# Patient Record
Sex: MALE | Race: WHITE | NOT HISPANIC OR LATINO | Employment: OTHER | URBAN - METROPOLITAN AREA
[De-identification: names, ages, dates, MRNs, and addresses within clinical notes are randomized per-mention and may not be internally consistent; named-entity substitution may affect disease eponyms.]

---

## 2021-08-17 RX ORDER — SODIUM, POTASSIUM,MAG SULFATES 17.5-3.13G
SOLUTION, RECONSTITUTED, ORAL ORAL
Qty: 1 ML | Refills: 0 | OUTPATIENT
Start: 2021-08-17 | End: 2023-03-06

## 2021-08-31 ENCOUNTER — OUTSIDE FACILITY SERVICE (OUTPATIENT)
Dept: GASTROENTEROLOGY | Facility: CLINIC | Age: 51
End: 2021-08-31

## 2021-08-31 PROCEDURE — 45378 DIAGNOSTIC COLONOSCOPY: CPT | Performed by: INTERNAL MEDICINE

## 2022-08-16 ENCOUNTER — OFFICE VISIT (OUTPATIENT)
Dept: ENDOCRINOLOGY | Facility: CLINIC | Age: 52
End: 2022-08-16

## 2022-08-16 VITALS
BODY MASS INDEX: 22.33 KG/M2 | HEIGHT: 70 IN | OXYGEN SATURATION: 98 % | HEART RATE: 76 BPM | DIASTOLIC BLOOD PRESSURE: 78 MMHG | SYSTOLIC BLOOD PRESSURE: 124 MMHG | WEIGHT: 156 LBS

## 2022-08-16 DIAGNOSIS — Z79.4 INSULIN LONG-TERM USE: ICD-10-CM

## 2022-08-16 DIAGNOSIS — E03.9 HYPOTHYROIDISM (ACQUIRED): ICD-10-CM

## 2022-08-16 DIAGNOSIS — Z79.4 TYPE 2 DIABETES MELLITUS WITH HYPERGLYCEMIA, WITH LONG-TERM CURRENT USE OF INSULIN: Primary | ICD-10-CM

## 2022-08-16 DIAGNOSIS — E78.2 MIXED HYPERLIPIDEMIA: ICD-10-CM

## 2022-08-16 DIAGNOSIS — E11.65 TYPE 2 DIABETES MELLITUS WITH HYPERGLYCEMIA, WITH LONG-TERM CURRENT USE OF INSULIN: Primary | ICD-10-CM

## 2022-08-16 LAB
EXPIRATION DATE: ABNORMAL
GLUCOSE BLDC GLUCOMTR-MCNC: 191 MG/DL (ref 70–130)
Lab: ABNORMAL

## 2022-08-16 PROCEDURE — 82947 ASSAY GLUCOSE BLOOD QUANT: CPT | Performed by: INTERNAL MEDICINE

## 2022-08-16 PROCEDURE — 99204 OFFICE O/P NEW MOD 45 MIN: CPT | Performed by: INTERNAL MEDICINE

## 2022-08-16 RX ORDER — SIMVASTATIN 20 MG
TABLET ORAL
COMMUNITY
Start: 2022-08-10

## 2022-08-16 RX ORDER — EMPAGLIFLOZIN 10 MG/1
TABLET, FILM COATED ORAL
COMMUNITY
Start: 2022-06-24 | End: 2022-08-16 | Stop reason: DRUGHIGH

## 2022-08-16 RX ORDER — BLOOD-GLUCOSE METER
1 EACH MISCELLANEOUS 2 TIMES DAILY
Qty: 1 KIT | Refills: 0 | Status: SHIPPED | OUTPATIENT
Start: 2022-08-16

## 2022-08-16 RX ORDER — METFORMIN HYDROCHLORIDE 500 MG/1
TABLET, EXTENDED RELEASE ORAL
COMMUNITY
Start: 2022-08-07

## 2022-08-16 RX ORDER — LANCETS
EACH MISCELLANEOUS
Qty: 200 EACH | Refills: 3 | Status: SHIPPED | OUTPATIENT
Start: 2022-08-16

## 2022-08-16 RX ORDER — LEVOTHYROXINE SODIUM 0.05 MG/1
TABLET ORAL
COMMUNITY
Start: 2022-06-24 | End: 2022-08-17 | Stop reason: DRUGHIGH

## 2022-08-16 RX ORDER — INSULIN GLARGINE 100 [IU]/ML
INJECTION, SOLUTION SUBCUTANEOUS
COMMUNITY
Start: 2022-07-29

## 2022-08-16 RX ORDER — BLOOD SUGAR DIAGNOSTIC
STRIP MISCELLANEOUS
Qty: 200 EACH | Refills: 3 | Status: SHIPPED | OUTPATIENT
Start: 2022-08-16

## 2022-08-16 RX ORDER — PEN NEEDLE, DIABETIC 31 G X1/4"
NEEDLE, DISPOSABLE MISCELLANEOUS
COMMUNITY
Start: 2022-06-16

## 2022-08-16 NOTE — ASSESSMENT & PLAN NOTE
Diabetes is worsening.   We discussed treatment options today.  Increase jardiance.  Try adding januvia.  Potential s/e discussed.  Diabetes will be reassessed in 3 months.    Sent in Rx for new glucometer today.

## 2022-08-16 NOTE — PROGRESS NOTES
"     Office Note      Date: 2022  Patient Name: Keny Rosen  MRN: [id number]  : 1970    Chief Complaint   Patient presents with   • Hypothyroidism   • Diabetes       History of Present Illness:   Keny Rosen is a 52 y.o. male who presents for Diabetes type 2. Diagnosed in: . Treated in past with oral agents. Current treatments: metformin, jardiance and basal insulin. Number of insulin shots per day: 1. Checks blood sugar 3 times a week. Has low blood sugar: rare. Aspirin use: No -  . Statin use: Yes. ACE-I/ARB use: No -  . Last eye exam: 2021.    Last A1c was 9.6%.  He has had DM education in the past.     He has h/o hypothyroidism.  He is on T4 50mcg qd.  Last TSH was 11 about 2 months ago.  He says he wasn't taking the T4 regularly at this time.  He is taking the T4 correctly.  He isn't taking any interfering meds concurrently.      Subjective      Diabetic Complications:  Eyes: No  Kidneys: No  Feet: No  Heart: No    Diet and Exercise:  Meals per day: >4  Minutes of exercise per week: 0 mins.    Review of Systems:   Review of Systems   Constitutional: Negative.    HENT: Positive for hearing loss.    Eyes: Negative.    Respiratory: Negative.    Cardiovascular: Negative.    Gastrointestinal: Negative.    Endocrine: Negative.    Genitourinary: Negative.    Musculoskeletal: Negative.    Skin: Negative.    Allergic/Immunologic: Negative.    Neurological: Negative.    Hematological: Negative.    Psychiatric/Behavioral: Negative.        The following portions of the patient's history were reviewed and updated as appropriate: allergies, current medications, past family history, past medical history, past social history, past surgical history and problem list.    Objective     Visit Vitals  /78   Pulse 76   Ht 177.8 cm (70\")   Wt 70.8 kg (156 lb)   SpO2 98%   BMI 22.38 kg/m²       Physical Exam:  Physical Exam  Constitutional:       Appearance: Normal appearance.   HENT:      Head: " Normocephalic and atraumatic.   Eyes:      Extraocular Movements: Extraocular movements intact.      Conjunctiva/sclera: Conjunctivae normal.      Pupils: Pupils are equal, round, and reactive to light.   Cardiovascular:      Rate and Rhythm: Normal rate and regular rhythm.      Pulses: Normal pulses.      Heart sounds: Normal heart sounds.   Pulmonary:      Effort: Pulmonary effort is normal.      Breath sounds: Normal breath sounds.   Abdominal:      General: Bowel sounds are normal.      Palpations: Abdomen is soft.   Musculoskeletal:         General: Normal range of motion.      Cervical back: Normal range of motion and neck supple.   Skin:     General: Skin is warm and dry.   Neurological:      General: No focal deficit present.      Mental Status: He is alert.   Psychiatric:         Mood and Affect: Mood normal.         Behavior: Behavior normal.         Thought Content: Thought content normal.         Judgment: Judgment normal.         Labs:    HbA1c  No results found for: HGBA1C.    CMP  No results found for: GLUCOSE, BUN, CREATININE, EGFRIFNONA, EGFRIFAFRI, BCR, K, CO2, CALCIUM, PROTENTOTREF, LABIL2, BILIRUBIN, AST, ALT     Lipid Panel        TSH  No results found for: TSH, FREET4     Hemoglobin A1C        Microalbumin/Creatinine  No results found for: MALBCRERATI        Assessment / Plan      Assessment & Plan:  Diagnoses and all orders for this visit:    1. Type 2 diabetes mellitus with hyperglycemia, with long-term current use of insulin (HCC) (Primary)  Assessment & Plan:  Diabetes is worsening.   We discussed treatment options today.  Increase jardiance.  Try adding januvia.  Potential s/e discussed.  Diabetes will be reassessed in 3 months.    Sent in Rx for new glucometer today.    Orders:  -     POC Glucose, Blood    2. Hypothyroidism (acquired)  Assessment & Plan:  Continue T4.  Check TSH today.  Will send note about results.    Orders:  -     TSH; Future    3. Mixed hyperlipidemia  Assessment &  Plan:  Continue statin.  Plan to check lipids next visit.      4. Insulin long-term use (HCC)    Other orders  -     empagliflozin (JARDIANCE) 25 MG tablet tablet; Take 1 tablet by mouth Daily.  Dispense: 90 tablet; Refill: 3  -     Accu-Chek FastClix Lancets misc; Use BID; ICD-10 E11.65; Z79.4  Dispense: 200 each; Refill: 3  -     SITagliptin (Januvia) 100 MG tablet; Take 1 tablet by mouth Daily.  Dispense: 90 tablet; Refill: 3  -     Blood Glucose Monitoring Suppl (Accu-Chek Guide Me) w/Device kit; 1 kit 2 (Two) Times a Day.  Dispense: 1 kit; Refill: 0  -     Accu-Chek Guide test strip; Use BID; ICD-10 E11.65; Z79.4  Dispense: 200 each; Refill: 3       Return in about 3 months (around 11/16/2022) for Recheck with A1c, CMP, lipids, TSH, microalbumin.    Carlos Eduardo Chatman MD   08/16/2022

## 2022-08-17 LAB — TSH SERPL DL<=0.005 MIU/L-ACNC: 4.32 UIU/ML (ref 0.27–4.2)

## 2022-08-17 RX ORDER — LEVOTHYROXINE SODIUM 0.07 MG/1
75 TABLET ORAL DAILY
Qty: 90 TABLET | Refills: 3 | OUTPATIENT
Start: 2022-08-17 | End: 2023-03-06

## 2022-11-23 ENCOUNTER — OFFICE VISIT (OUTPATIENT)
Dept: ENDOCRINOLOGY | Facility: CLINIC | Age: 52
End: 2022-11-23

## 2022-11-23 VITALS
DIASTOLIC BLOOD PRESSURE: 70 MMHG | BODY MASS INDEX: 22.62 KG/M2 | OXYGEN SATURATION: 98 % | SYSTOLIC BLOOD PRESSURE: 120 MMHG | HEIGHT: 70 IN | HEART RATE: 72 BPM | WEIGHT: 158 LBS

## 2022-11-23 DIAGNOSIS — Z79.4 TYPE 2 DIABETES MELLITUS WITH HYPERGLYCEMIA, WITH LONG-TERM CURRENT USE OF INSULIN: Primary | ICD-10-CM

## 2022-11-23 DIAGNOSIS — E03.9 HYPOTHYROIDISM (ACQUIRED): ICD-10-CM

## 2022-11-23 DIAGNOSIS — E11.649 TYPE 2 DIABETES MELLITUS WITH HYPOGLYCEMIA WITHOUT COMA, WITH LONG-TERM CURRENT USE OF INSULIN: ICD-10-CM

## 2022-11-23 DIAGNOSIS — Z79.4 TYPE 2 DIABETES MELLITUS WITH HYPOGLYCEMIA WITHOUT COMA, WITH LONG-TERM CURRENT USE OF INSULIN: ICD-10-CM

## 2022-11-23 DIAGNOSIS — E11.65 TYPE 2 DIABETES MELLITUS WITH HYPERGLYCEMIA, WITH LONG-TERM CURRENT USE OF INSULIN: Primary | ICD-10-CM

## 2022-11-23 DIAGNOSIS — Z79.4 INSULIN LONG-TERM USE: ICD-10-CM

## 2022-11-23 DIAGNOSIS — E78.2 MIXED HYPERLIPIDEMIA: ICD-10-CM

## 2022-11-23 LAB
EXPIRATION DATE: ABNORMAL
EXPIRATION DATE: NORMAL
GLUCOSE BLDC GLUCOMTR-MCNC: 270 MG/DL (ref 70–130)
HBA1C MFR BLD: 7.9 %
Lab: ABNORMAL
Lab: NORMAL

## 2022-11-23 PROCEDURE — 83036 HEMOGLOBIN GLYCOSYLATED A1C: CPT | Performed by: INTERNAL MEDICINE

## 2022-11-23 PROCEDURE — 99214 OFFICE O/P EST MOD 30 MIN: CPT | Performed by: INTERNAL MEDICINE

## 2022-11-23 PROCEDURE — 82947 ASSAY GLUCOSE BLOOD QUANT: CPT | Performed by: INTERNAL MEDICINE

## 2022-11-23 NOTE — PROGRESS NOTES
"     Office Note      Date: 2022  Patient Name: Keny Rosen  MRN: [id number]  : 1970    Chief Complaint   Patient presents with   • Diabetes     Type II   • Hypothyroidism       History of Present Illness:   Keny Rosen is a 52 y.o. male who presents for Diabetes type 2. Diagnosed in: . Treated in past with oral agents. Current treatments: januvia, metformin, jardiance and basal insulin. Number of insulin shots per day: 1. Checks blood sugar 3 times a week. Has low blood sugar: rare. Aspirin use: No -  . Statin use: Yes. ACE-I/ARB use: No -  . Change in health since last visit: none. Last eye exam: 2021.      He has h/o hypothyroidism.  He is on T4 75mcg qd.  He is taking the T4 correctly.  He isn't taking any interfering meds concurrently.  He denies any sxs of hypo- or hyperthyroidism.  He hasn't noted any change in the size of his neck.  He denies any compressive sxs.      Subjective      Diabetic Complications:  Eyes: No  Kidneys: No  Feet: No  Heart: No    Diet and Exercise:  Meals per day: >4  Minutes of exercise per week: 0 mins.    Review of Systems:   Review of Systems   Constitutional: Negative.    Cardiovascular: Negative.    Gastrointestinal: Negative.    Endocrine: Negative.        The following portions of the patient's history were reviewed and updated as appropriate: allergies, current medications, past family history, past medical history, past social history, past surgical history and problem list.    Objective       Visit Vitals  /70 (BP Location: Left arm, Patient Position: Sitting, Cuff Size: Adult)   Pulse 72   Ht 177.8 cm (70\")   Wt 71.7 kg (158 lb)   SpO2 98%   BMI 22.67 kg/m²       Physical Exam:  Physical Exam  Constitutional:       Appearance: Normal appearance.   Neurological:      Mental Status: He is alert.         Labs:    HbA1c  Lab Results   Component Value Date    HGBA1C 7.9 2022       CMP  No results found for: GLUCOSE, BUN, CREATININE, " EGFRIFNONA, EGFRIFAFRI, BCR, K, CO2, CALCIUM, PROTENTOTREF, LABIL2, BILIRUBIN, AST, ALT     Lipid Panel        TSH  Lab Results   Component Value Date    TSH 4.320 (H) 08/16/2022        Hemoglobin A1C  Lab Results   Component Value Date    HGBA1C 7.9 11/23/2022        Microalbumin/Creatinine  No results found for: MALBCRERATIO, CREATINIURIN, MICROALBUR        Assessment / Plan      Assessment & Plan:  Diagnoses and all orders for this visit:    1. Type 2 diabetes mellitus with hyperglycemia, with long-term current use of insulin (HCC) (Primary)  Assessment & Plan:  Diabetes is improving with treatment.  A1c much better but having some fasting hypoglycemia.  Continue current treatment regimen.  Decrease basal insulin.  Work on diet/exercise.  Diabetes will be reassessed in 3 months.    Orders:  -     POC Glucose, Blood  -     POC Glycosylated Hemoglobin (Hb A1C)  -     Comprehensive Metabolic Panel; Future  -     Lipid Panel; Future  -     Microalbumin / Creatinine Urine Ratio - Urine, Clean Catch; Future  -     TSH; Future    2. Mixed hyperlipidemia  Assessment & Plan:  Continue statin.  Check lipids today.      3. Hypothyroidism (acquired)  Assessment & Plan:  Continue T4 tx.  Check TSH.      4. Insulin long-term use (HCC)    5. Type 2 diabetes mellitus with hypoglycemia without coma, with long-term current use of insulin (HCC)  Assessment & Plan:  Some fasting hypoglycemia.  Decrease basal insulin.        Return in about 3 months (around 2/23/2023) for Recheck with A1c, TSH.    Carlos Eduardo Chatman MD   11/23/2022

## 2022-11-23 NOTE — ASSESSMENT & PLAN NOTE
Diabetes is improving with treatment.  A1c much better but having some fasting hypoglycemia.  Continue current treatment regimen.  Decrease basal insulin.  Work on diet/exercise.  Diabetes will be reassessed in 3 months.

## 2022-11-24 LAB
ALBUMIN SERPL-MCNC: 4.6 G/DL (ref 3.5–5.2)
ALBUMIN/CREAT UR: 34 MG/G CREAT (ref 0–29)
ALBUMIN/GLOB SERPL: 2.7 G/DL
ALP SERPL-CCNC: 88 U/L (ref 39–117)
ALT SERPL-CCNC: 32 U/L (ref 1–41)
AST SERPL-CCNC: 31 U/L (ref 1–40)
BILIRUB SERPL-MCNC: 0.5 MG/DL (ref 0–1.2)
BUN SERPL-MCNC: 19 MG/DL (ref 6–20)
BUN/CREAT SERPL: 22.4 (ref 7–25)
CALCIUM SERPL-MCNC: 10.2 MG/DL (ref 8.6–10.5)
CHLORIDE SERPL-SCNC: 103 MMOL/L (ref 98–107)
CHOLEST SERPL-MCNC: 130 MG/DL (ref 0–200)
CO2 SERPL-SCNC: 24.4 MMOL/L (ref 22–29)
CREAT SERPL-MCNC: 0.85 MG/DL (ref 0.76–1.27)
CREAT UR-MCNC: 42.7 MG/DL
EGFRCR SERPLBLD CKD-EPI 2021: 104.6 ML/MIN/1.73
GLOBULIN SER CALC-MCNC: 1.7 GM/DL
GLUCOSE SERPL-MCNC: 230 MG/DL (ref 65–99)
HDLC SERPL-MCNC: 71 MG/DL (ref 40–60)
LDLC SERPL CALC-MCNC: 49 MG/DL (ref 0–100)
MICROALBUMIN UR-MCNC: 14.5 UG/ML
POTASSIUM SERPL-SCNC: 4.5 MMOL/L (ref 3.5–5.2)
PROT SERPL-MCNC: 6.3 G/DL (ref 6–8.5)
SODIUM SERPL-SCNC: 139 MMOL/L (ref 136–145)
TRIGL SERPL-MCNC: 41 MG/DL (ref 0–150)
TSH SERPL DL<=0.005 MIU/L-ACNC: 3.39 UIU/ML (ref 0.27–4.2)
VLDLC SERPL CALC-MCNC: 10 MG/DL (ref 5–40)

## 2023-03-06 ENCOUNTER — HOSPITAL ENCOUNTER (EMERGENCY)
Facility: HOSPITAL | Age: 53
Discharge: HOME | End: 2023-03-06
Attending: EMERGENCY MEDICINE | Admitting: EMERGENCY MEDICINE
Payer: COMMERCIAL

## 2023-03-06 ENCOUNTER — APPOINTMENT (OUTPATIENT)
Dept: MRI IMAGING | Facility: HOSPITAL | Age: 53
End: 2023-03-06
Payer: COMMERCIAL

## 2023-03-06 VITALS
OXYGEN SATURATION: 98 % | TEMPERATURE: 98.3 F | WEIGHT: 160 LBS | HEIGHT: 70 IN | DIASTOLIC BLOOD PRESSURE: 81 MMHG | SYSTOLIC BLOOD PRESSURE: 122 MMHG | RESPIRATION RATE: 20 BRPM | HEART RATE: 68 BPM | BODY MASS INDEX: 22.9 KG/M2

## 2023-03-06 DIAGNOSIS — H81.01 MENIERE'S DISEASE OF RIGHT EAR: Primary | ICD-10-CM

## 2023-03-06 PROCEDURE — 70551 MRI BRAIN STEM W/O DYE: CPT

## 2023-03-06 PROCEDURE — 99282 EMERGENCY DEPT VISIT SF MDM: CPT

## 2023-03-06 RX ORDER — MECLIZINE HYDROCHLORIDE 25 MG/1
25 TABLET ORAL 4 TIMES DAILY PRN
Qty: 30 TABLET | Refills: 0 | Status: SHIPPED | OUTPATIENT
Start: 2023-03-06

## 2023-03-06 NOTE — ED PROVIDER NOTES
Subjective   History of Present Illness  52-year-old male presents emergency department today with severe dizziness.  He says she been having this going off and on for the past couple weeks.  He was seen by his primary care doctors and then seen by ENT but was not having symptoms at the time.  They told him if it got worse to call.  He called and was unable to get in.  He states that the dizziness was so severe today he was unable to drive for about 3 hours and was stuck in [facility].  He is tried over-the-counter Dramamine without any relief that his primary care given him a short course of steroids without any relief he is a type II diabetic but does use insulin.  The dizziness is vertiginous in nature.  It causes nausea and vomiting.  Turning his head seems to exacerbate this.  He is unable to get into anyone other than his ENT has a follow-up appointment on the 20th of this month said no fevers no chills.  He has had some ringing in the right ear.  No focal neurological deficits    History provided by:  Patient and significant other   used: No    Dizziness  Quality:  Vertigo and room spinning  Severity:  Severe  Onset quality:  Sudden  Duration:  2 weeks  Timing:  Intermittent  Progression:  Worsening  Chronicity:  New  Context: eye movement and head movement    Context: not with ear pain    Relieved by:  Turning head  Worsened by:  Turning head  Ineffective treatments:  None tried  Associated symptoms: nausea, tinnitus and vomiting    Associated symptoms: no blood in stool, no chest pain, no diarrhea, no headaches, no palpitations, no shortness of breath, no vision changes and no weakness    Risk factors: no anemia, no hx of vertigo, no Meniere's disease, no multiple medications and no new medications    Earache  Associated symptoms: tinnitus and vomiting    Associated symptoms: no diarrhea, no headaches and no rash        Review of Systems   Constitutional: Negative for chills.    HENT: Positive for ear pain and tinnitus.    Respiratory: Negative for choking, shortness of breath and wheezing.    Cardiovascular: Negative for chest pain and palpitations.   Gastrointestinal: Positive for nausea and vomiting. Negative for blood in stool and diarrhea.   Genitourinary: Negative for dysuria, frequency and urgency.   Musculoskeletal: Negative for back pain.   Skin: Negative for pallor and rash.   Neurological: Positive for dizziness. Negative for weakness and headaches.   Psychiatric/Behavioral: Negative.    All other systems reviewed and are negative.      Past Medical History:   Diagnosis Date   • Hearing loss    • Hypothyroidism    • Type 2 diabetes mellitus (HCC)        No Known Allergies    Past Surgical History:   Procedure Laterality Date   • WISDOM TOOTH EXTRACTION N/A        Family History   Problem Relation Age of Onset   • Thyroid disease Mother    • Thyroid disease Father    • Thyroid disease Sister        Social History     Socioeconomic History   • Marital status:    Tobacco Use   • Smoking status: Former     Packs/day: 0.50     Years: 10.00     Pack years: 5.00     Types: Cigarettes   • Smokeless tobacco: Never   Vaping Use   • Vaping Use: Never used   Substance and Sexual Activity   • Alcohol use: Yes     Comment: social   • Drug use: Never   • Sexual activity: Defer           Objective   Physical Exam  Vitals and nursing note reviewed.   Constitutional:       Appearance: He is well-developed.   HENT:      Head: Normocephalic and atraumatic.      Right Ear: External ear normal.      Left Ear: External ear normal.      Nose: Nose normal.   Eyes:      General: No scleral icterus.     Conjunctiva/sclera: Conjunctivae normal.      Pupils: Pupils are equal, round, and reactive to light.      Comments: Currently asymptomatic no lateral nystagmus   Neck:      Thyroid: No thyromegaly.   Cardiovascular:      Rate and Rhythm: Normal rate and regular rhythm.      Heart sounds: Normal  "heart sounds.   Pulmonary:      Effort: Pulmonary effort is normal. No respiratory distress.      Breath sounds: Normal breath sounds. No wheezing or rales.   Chest:      Chest wall: No tenderness.   Abdominal:      General: Bowel sounds are normal. There is no distension.      Palpations: Abdomen is soft.      Tenderness: There is no abdominal tenderness.   Musculoskeletal:         General: Normal range of motion.      Cervical back: Normal range of motion.   Lymphadenopathy:      Cervical: No cervical adenopathy.   Skin:     General: Skin is warm and dry.   Neurological:      Mental Status: He is alert and oriented to person, place, and time.      Cranial Nerves: No cranial nerve deficit.      Coordination: Coordination normal.      Deep Tendon Reflexes: Reflexes are normal and symmetric. Reflexes normal.   Psychiatric:         Behavior: Behavior normal.         Thought Content: Thought content normal.         Judgment: Judgment normal.         Procedures           ED Course                No results found for this or any previous visit (from the past 24 hour(s)).  Note: In addition to lab results from this visit, the labs listed above may include labs taken at another facility or during a different encounter within the last 24 hours. Please correlate lab times with ED admission and discharge times for further clarification of the services performed during this visit.    MRI Brain Without Contrast   Final Result   Impression:    No evidence of acute infarction or other acute intracranial disease. Incidentally noted extensive, likely chronic maxillary sinus disease and ethmoid sinus disease.      Electronically Signed: Sawyer Parrish     3/6/2023 5:58 PM EST     Workstation ID: [id number]        Vitals:    03/06/23 1432   BP: 122/81   BP Location: Left arm   Patient Position: Sitting   Pulse: 68   Resp: 20   Temp: 98.3 °F (36.8 °C)   TempSrc: Oral   SpO2: 98%   Weight: 72.6 kg (160 lb)   Height: 177.8 cm (70\") "     Medications - No data to display  ECG/EMG Results (last 24 hours)     ** No results found for the last 24 hours. **        No orders to display                                  Medical Decision Making  But a 3-week history of vertiginous type dizziness.  Seen by ENT no further work-up.  Symptoms seem to be getting worse.  He has had a trial of steroids without any relief tried Dramamine without any relief.   It was elected to do an MRI of the brain to rule out a stroke since it seems to be worsening MRI was negative for acute findings he will be follow-up with Dr. Lyman his ENT and was started on Atarax vies not to drive while dizzy  Differential diagnosis #1 Ménière's disease #2 benign positional vertigo #3 posterior stroke    Meniere's disease of right ear: acute illness or injury  Amount and/or Complexity of Data Reviewed  Radiology: ordered and independent interpretation performed. Decision-making details documented in ED Course.          Final diagnoses:   Meniere's disease of right ear       ED Disposition  ED Disposition     ED Disposition   Discharge    Condition   Stable    Comment   --             Baltazar Lyman MD  [address] [id number] [phone]      Call for appointment         Medication List      New Prescriptions    meclizine 25 MG tablet  Commonly known as: ANTIVERT  Take 1 tablet by mouth 4 (Four) Times a Day As Needed for Dizziness.        Stop    levothyroxine 75 MCG tablet  Commonly known as: SYNTHROID, LEVOTHROID     Suprep Bowel Prep Kit 17.5-3.13-1.6 GM/177ML solution oral solution  Generic drug: sodium-potassium-magnesium sulfates           Where to Get Your Medications      These medications were sent to [facility] [id number] - [place], [place] - [address] - [phone]  - [phone] FX  [address] [address]    Phone: [phone]   · meclizine 25 MG tablet          Stanislav Polk PA  03/06/23 5749

## 2023-09-11 RX ORDER — SITAGLIPTIN 100 MG/1
TABLET, FILM COATED ORAL
Qty: 90 TABLET | Refills: 3 | Status: SHIPPED | OUTPATIENT
Start: 2023-09-11

## 2023-09-11 NOTE — TELEPHONE ENCOUNTER
Rx Refill Note  Requested Prescriptions     Pending Prescriptions Disp Refills    Januvia 100 MG tablet [Pharmacy Med Name: JANUVIA 100 MG TABLET] 90 tablet 3     Sig: TAKE ONE TABLET BY MOUTH DAILY      Last office visit with prescribing clinician: 11/23/2022     Next office visit with prescribing clinician: 11/28/2023

## 2023-10-09 RX ORDER — EMPAGLIFLOZIN 25 MG/1
25 TABLET, FILM COATED ORAL DAILY
Qty: 90 TABLET | Refills: 0 | Status: SHIPPED | OUTPATIENT
Start: 2023-10-09

## 2023-10-09 NOTE — TELEPHONE ENCOUNTER
Rx Refill Note  Requested Prescriptions     Pending Prescriptions Disp Refills    Jardiance 25 MG tablet tablet [Pharmacy Med Name: JARDIANCE 25 MG TABLET] 90 tablet 3     Sig: TAKE ONE TABLET BY MOUTH DAILY      Last office visit with prescribing clinician: 11/23/2022   Last telemedicine visit with prescribing clinician: Visit date not found   Next office visit with prescribing clinician: 11/28/2023                         Would you like a call back once the refill request has been completed: [] Yes [] No    If the office needs to give you a call back, can they leave a voicemail: [] Yes [] No    Machelle Weber CMA  10/09/23, 08:05 EDT

## 2023-10-19 RX ORDER — LEVOTHYROXINE SODIUM 0.07 MG/1
75 TABLET ORAL DAILY
Qty: 90 TABLET | Refills: 3 | OUTPATIENT
Start: 2023-10-19

## 2023-11-08 RX ORDER — LEVOTHYROXINE SODIUM 0.07 MG/1
75 TABLET ORAL DAILY
Qty: 90 TABLET | Refills: 3 | OUTPATIENT
Start: 2023-11-08

## 2024-01-03 ENCOUNTER — DOCUMENTATION (OUTPATIENT)
Dept: ENDOCRINOLOGY | Facility: CLINIC | Age: 54
End: 2024-01-03
Payer: COMMERCIAL

## 2024-01-03 ENCOUNTER — OFFICE VISIT (OUTPATIENT)
Dept: ENDOCRINOLOGY | Facility: CLINIC | Age: 54
End: 2024-01-03
Payer: COMMERCIAL

## 2024-01-03 VITALS
HEART RATE: 76 BPM | OXYGEN SATURATION: 96 % | WEIGHT: 152 LBS | BODY MASS INDEX: 21.76 KG/M2 | HEIGHT: 70 IN | SYSTOLIC BLOOD PRESSURE: 120 MMHG | DIASTOLIC BLOOD PRESSURE: 80 MMHG

## 2024-01-03 DIAGNOSIS — E11.65 TYPE 2 DIABETES MELLITUS WITH HYPERGLYCEMIA, WITH LONG-TERM CURRENT USE OF INSULIN: Primary | ICD-10-CM

## 2024-01-03 DIAGNOSIS — Z79.4 TYPE 2 DIABETES MELLITUS WITH HYPERGLYCEMIA, WITH LONG-TERM CURRENT USE OF INSULIN: Primary | ICD-10-CM

## 2024-01-03 DIAGNOSIS — E03.9 HYPOTHYROIDISM (ACQUIRED): ICD-10-CM

## 2024-01-03 LAB
ALBUMIN UR-MCNC: <1.2 MG/DL
CREAT UR-MCNC: 68.3 MG/DL
EXPIRATION DATE: ABNORMAL
EXPIRATION DATE: ABNORMAL
GLUCOSE BLDC GLUCOMTR-MCNC: 189 MG/DL (ref 70–130)
HBA1C MFR BLD: 8.8 % (ref 4.5–5.7)
Lab: ABNORMAL
Lab: ABNORMAL
MICROALBUMIN/CREAT UR: NORMAL MG/G{CREAT}

## 2024-01-03 PROCEDURE — 99214 OFFICE O/P EST MOD 30 MIN: CPT | Performed by: PHYSICIAN ASSISTANT

## 2024-01-03 PROCEDURE — 82570 ASSAY OF URINE CREATININE: CPT | Performed by: PHYSICIAN ASSISTANT

## 2024-01-03 PROCEDURE — 82043 UR ALBUMIN QUANTITATIVE: CPT | Performed by: PHYSICIAN ASSISTANT

## 2024-01-03 PROCEDURE — 80053 COMPREHEN METABOLIC PANEL: CPT | Performed by: PHYSICIAN ASSISTANT

## 2024-01-03 PROCEDURE — 84443 ASSAY THYROID STIM HORMONE: CPT | Performed by: PHYSICIAN ASSISTANT

## 2024-01-03 PROCEDURE — 82947 ASSAY GLUCOSE BLOOD QUANT: CPT | Performed by: PHYSICIAN ASSISTANT

## 2024-01-03 PROCEDURE — 83036 HEMOGLOBIN GLYCOSYLATED A1C: CPT | Performed by: PHYSICIAN ASSISTANT

## 2024-01-03 RX ORDER — ACYCLOVIR 400 MG/1
1 TABLET ORAL
Qty: 1 EACH | Refills: 5 | Status: SHIPPED | OUTPATIENT
Start: 2024-01-03

## 2024-01-03 RX ORDER — LEVOTHYROXINE SODIUM 0.07 MG/1
75 TABLET ORAL DAILY
Start: 2024-01-03

## 2024-01-03 NOTE — PROGRESS NOTES
Pt seen for CGM education. Set up G7 sensor and paired with clinic for sharing. Encouraged pt to call with questions.

## 2024-01-03 NOTE — PROGRESS NOTES
Office Note      Date: 2024  Patient Name: Keny Rosen  MRN: [id number]  : 1970    Chief Complaint   Patient presents with    Diabetes       History of Present Illness:   Keny Rosen is a 53 y.o. male who presents for Diabetes type 2.     Keny Rosen is a 52 y.o. male who presents for Diabetes type 2. Diagnosed in: . Treated in past with oral agents. Current treatments: januvia, metformin, jardiance and basal insulin. Number of insulin shots per day: 1. Checks blood sugar rarely. Has low blood sugar: occasionally overnight. Aspirin use: No -  . Statin use: Yes. ACE-I/ARB use: No -  . Change in health since last visit: episode of vertigo and kidney stone. Last eye exam: 2021.     Still having some overnight low readings overnight, in the 60-70s once every 2 weeks, has had some high 50s. He is symptomatic with feeling shaky, jittery. Diet is the same, eats the same thing everyday. Due to travel with job, often eats fast food, diet drinks and water. Eats potatoes and few other vegetables. Concerned about continued weight loss.     Had A1c checked by PCP 2023 and was 9.8 at that time.     He has h/o hypothyroidism.  He is on T4 75mcg qd.  He is taking the T4 correctly.  He isn't taking any interfering meds concurrently.  He denies any sxs of hypo- or hyperthyroidism.  He hasn't noted any change in the size of his neck.  He denies any compressive sxs.         Last A1c:  Hemoglobin A1C   Date Value Ref Range Status   2024 8.8 (A) 4.5 - 5.7 % Final       Changes in health since last visit: kidney stone, vertigo- seeing ENT. Last eye exam .    Subjective          Review of Systems:   Review of Systems   Constitutional:  Positive for unexpected weight change. Negative for activity change and appetite change.   HENT:  Negative for trouble swallowing and voice change.    Respiratory:  Negative for chest tightness.    Cardiovascular:  Negative for palpitations.  "  Genitourinary:  Positive for frequency.   Neurological:  Negative for headaches.   Psychiatric/Behavioral:  The patient is not nervous/anxious.        The following portions of the patient's history were reviewed and updated as appropriate: allergies, current medications, past family history, past medical history, past social history, past surgical history, and problem list.    Objective     Visit Vitals  /80   Pulse 76   Ht 177.8 cm (70\")   Wt 68.9 kg (152 lb)   SpO2 96%   BMI 21.81 kg/m²           Physical Exam:  Physical Exam  Vitals and nursing note reviewed.   Constitutional:       General: He is not in acute distress.     Appearance: Normal appearance. He is well-developed.   HENT:      Head: Normocephalic and atraumatic.      Right Ear: External ear normal.      Left Ear: External ear normal.      Nose: Nose normal.      Mouth/Throat:      Mouth: Mucous membranes are dry.   Eyes:      General: No scleral icterus.     Conjunctiva/sclera: Conjunctivae normal.      Pupils: Pupils are equal, round, and reactive to light.   Neck:      Thyroid: No thyroid mass, thyromegaly or thyroid tenderness.   Cardiovascular:      Rate and Rhythm: Normal rate and regular rhythm.      Pulses: Normal pulses.           Dorsalis pedis pulses are 2+ on the right side and 2+ on the left side.        Posterior tibial pulses are 2+ on the right side and 2+ on the left side.      Heart sounds: Normal heart sounds. No murmur heard.     No friction rub. No gallop.   Pulmonary:      Effort: Pulmonary effort is normal. No respiratory distress.      Breath sounds: Normal breath sounds. No stridor.   Abdominal:      Tenderness: There is no guarding.   Musculoskeletal:         General: No deformity. Normal range of motion.      Cervical back: Normal range of motion and neck supple.      Right foot: Normal range of motion. No deformity, bunion, Charcot foot, foot drop or prominent metatarsal heads.      Left foot: Normal range of " motion. No deformity, bunion, Charcot foot, foot drop or prominent metatarsal heads.   Feet:      Right foot:      Protective Sensation: 10 sites tested.  10 sites sensed.      Skin integrity: Skin integrity normal. No ulcer, blister, skin breakdown, erythema, warmth, callus, dry skin or fissure.      Toenail Condition: Right toenails are normal.      Left foot:      Protective Sensation: 10 sites tested.  10 sites sensed.      Skin integrity: Skin integrity normal. No ulcer, blister, skin breakdown, erythema, warmth, callus, dry skin or fissure.      Toenail Condition: Left toenails are normal.      Comments: Diabetic Foot Exam Performed      Skin:     General: Skin is warm and dry.      Coloration: Skin is not jaundiced.   Neurological:      Mental Status: He is alert and oriented to person, place, and time. Mental status is at baseline.   Psychiatric:         Mood and Affect: Mood normal.         Behavior: Behavior normal.         Thought Content: Thought content normal.         Judgment: Judgment normal.          Assessment / Plan    Diagnoses and all orders for this visit:    1. Type 2 diabetes mellitus with hyperglycemia, with long-term current use of insulin (Primary)    A1c improved since visit with PCP in June 2023, from 9.8 to 8.8. Pt is at maximum dose for current oral medications and d/t overnight hypoglycemia, hesitant to increase basal insulin, continue current dose of 28 units. Will avoid GLP-1 inhibitor d/t concerns with weight loss.  Pt likely needs mealtime insulin, but will start with having patient wear a continuous glucose monitor. Gave pt sample of Dexcom G7 and educated him on using it, sent rx to pharmacy. Plan to make medication adjustments when CGM data available.  Advised pt to schedule diabetic eye exam.  Lipid panel done 6/2023 per PCP.  Urine microalbumin/creatinine ratio and foot exam done today.    Relevant Medications   Lantus SoloStar 100 UNIT/ML injection pen   metFORMIN ER  (GLUCOPHAGE-XR) 500 MG 24 hr tablet   SITagliptin (Januvia) 100 MG tablet   Jardiance 25 MG tablet tablet     -     POC Glucose, Blood  -     POC Glycosylated Hemoglobin (Hb A1C)  -     Comprehensive Metabolic Panel; Future  -     Microalbumin / Creatinine Urine Ratio - Urine, Clean Catch; Future  -     Continuous Blood Gluc Sensor (Dexcom G7 Sensor) misc; Use 1 Device Every 10 (Ten) Days.  Dispense: 1 each; Refill: 5  -     Comprehensive Metabolic Panel  -     Microalbumin / Creatinine Urine Ratio - Urine, Clean Catch    2. Hypothyroidism (acquired)    Clinically euthyroid. Check thyroid function test today. Further recommendations based on results.    -     TSH Rfx On Abnormal To Free T4; Future  -     TSH Rfx On Abnormal To Free T4          Electronically signed by Deborah Higgins PA-C  [facility] [place]  01/03/2024

## 2024-01-04 ENCOUNTER — PRIOR AUTHORIZATION (OUTPATIENT)
Dept: ENDOCRINOLOGY | Facility: CLINIC | Age: 54
End: 2024-01-04
Payer: COMMERCIAL

## 2024-01-04 LAB
ALBUMIN SERPL-MCNC: 4.6 G/DL (ref 3.5–5.2)
ALBUMIN/GLOB SERPL: 2 G/DL
ALP SERPL-CCNC: 69 U/L (ref 39–117)
ALT SERPL W P-5'-P-CCNC: 20 U/L (ref 1–41)
ANION GAP SERPL CALCULATED.3IONS-SCNC: 15 MMOL/L (ref 5–15)
AST SERPL-CCNC: 19 U/L (ref 1–40)
BILIRUB SERPL-MCNC: 0.6 MG/DL (ref 0–1.2)
BUN SERPL-MCNC: 14 MG/DL (ref 6–20)
BUN/CREAT SERPL: 12.2 (ref 7–25)
CALCIUM SPEC-SCNC: 9.9 MG/DL (ref 8.6–10.5)
CHLORIDE SERPL-SCNC: 103 MMOL/L (ref 98–107)
CO2 SERPL-SCNC: 25 MMOL/L (ref 22–29)
CREAT SERPL-MCNC: 1.15 MG/DL (ref 0.76–1.27)
EGFRCR SERPLBLD CKD-EPI 2021: 76.1 ML/MIN/1.73
GLOBULIN UR ELPH-MCNC: 2.3 GM/DL
GLUCOSE SERPL-MCNC: 245 MG/DL (ref 65–99)
POTASSIUM SERPL-SCNC: 4.1 MMOL/L (ref 3.5–5.2)
PROT SERPL-MCNC: 6.9 G/DL (ref 6–8.5)
SODIUM SERPL-SCNC: 143 MMOL/L (ref 136–145)
TSH SERPL DL<=0.05 MIU/L-ACNC: 1.87 UIU/ML (ref 0.27–4.2)

## 2024-01-05 ENCOUNTER — PATIENT MESSAGE (OUTPATIENT)
Dept: ENDOCRINOLOGY | Facility: CLINIC | Age: 54
End: 2024-01-05
Payer: COMMERCIAL

## 2024-01-05 NOTE — PROGRESS NOTES
Other than your glucose being elevated, your labs are within normal limits.     Let us know if you are having any problems with the Dexcom and please send me some readings after you have used it for about 2 weeks.

## 2024-01-08 RX ORDER — INSULIN LISPRO 100 [IU]/ML
5 INJECTION, SOLUTION INTRAVENOUS; SUBCUTANEOUS
Qty: 6 ML | Refills: 5 | Status: SHIPPED | OUTPATIENT
Start: 2024-01-08 | End: 2024-01-10 | Stop reason: CLARIF

## 2024-01-08 NOTE — TELEPHONE ENCOUNTER
Reviewed Dexcom for the past few days. Does show post prandial hyperglycemia. Will add humalog 5 units with meals and decrease lantus to 24 units qhs- pt is still having some overnight hypoglycemia. Discussed administration 15 minutes prior to meals. He will continue to monitor closely for lows. Pt will call or message with any problems or persistent glucose elevations.

## 2024-01-10 ENCOUNTER — PRIOR AUTHORIZATION (OUTPATIENT)
Dept: ENDOCRINOLOGY | Facility: CLINIC | Age: 54
End: 2024-01-10
Payer: COMMERCIAL

## 2024-01-10 RX ORDER — INSULIN ASPART 100 [IU]/ML
5 INJECTION, SOLUTION INTRAVENOUS; SUBCUTANEOUS
Qty: 6 ML | Refills: 3 | Status: SHIPPED | OUTPATIENT
Start: 2024-01-10

## 2024-01-15 NOTE — TELEPHONE ENCOUNTER
Rx Refill Note  Requested Prescriptions     Pending Prescriptions Disp Refills    empagliflozin (Jardiance) 25 MG tablet tablet 90 tablet 0     Sig: Take 1 tablet by mouth Daily.      Last office visit with prescribing clinician: 1/3/2024      Next office visit with prescribing clinician: 4/29/2024                  Wilmer Carpenter MA  01/15/24, 09:55 EST

## 2024-02-08 RX ORDER — INSULIN ASPART 100 [IU]/ML
5 INJECTION, SOLUTION INTRAVENOUS; SUBCUTANEOUS
Qty: 6 ML | Refills: 3 | Status: CANCELLED | OUTPATIENT
Start: 2024-02-08

## 2024-02-08 NOTE — TELEPHONE ENCOUNTER
JESSICA for pt. To call office, he requested we send to [facility] and we need more information on that pharmacy. [place]

## 2024-04-18 NOTE — TELEPHONE ENCOUNTER
[facility] calling for faxed prescriptions of:    empagliflozin (Jardiance) 25 MG tablet tablet     SITagliptin (Januvia) 100 MG tablet     Prefers 90 day with 3 refills    Needs to be faxed (not in [employer] system)  [phone]    Questions?  Phone: [phone]

## 2024-04-22 NOTE — TELEPHONE ENCOUNTER
[facility] calling for faxed prescriptions of:     empagliflozin (Jardiance) 25 MG tablet tablet     SITagliptin (Januvia) 100 MG tablet      Prefers 90 day with 3 refills     Needs to be faxed (not in [employer] system)  [phone]     Questions?  Phone: [phone]          [facility], but need faxed to [facility].

## 2024-04-23 ENCOUNTER — TELEPHONE (OUTPATIENT)
Dept: ENDOCRINOLOGY | Facility: CLINIC | Age: 54
End: 2024-04-23

## 2024-04-23 NOTE — TELEPHONE ENCOUNTER
Caller: FREDA    Relationship to patient: [facility]    Best call back number: [phone]    Patient is needing: CALLING TO CHECK ON THE STATUS OF JANUVIA PRESCRIPTION. PLEASE CALL TO ADVISE

## 2024-04-29 ENCOUNTER — OFFICE VISIT (OUTPATIENT)
Dept: ENDOCRINOLOGY | Facility: CLINIC | Age: 54
End: 2024-04-29
Payer: COMMERCIAL

## 2024-04-29 VITALS
SYSTOLIC BLOOD PRESSURE: 128 MMHG | DIASTOLIC BLOOD PRESSURE: 58 MMHG | BODY MASS INDEX: 21.9 KG/M2 | HEART RATE: 80 BPM | OXYGEN SATURATION: 100 % | WEIGHT: 153 LBS | HEIGHT: 70 IN

## 2024-04-29 DIAGNOSIS — E11.65 TYPE 2 DIABETES MELLITUS WITH HYPERGLYCEMIA, WITH LONG-TERM CURRENT USE OF INSULIN: Primary | ICD-10-CM

## 2024-04-29 DIAGNOSIS — Z79.4 TYPE 2 DIABETES MELLITUS WITH HYPERGLYCEMIA, WITH LONG-TERM CURRENT USE OF INSULIN: Primary | ICD-10-CM

## 2024-04-29 LAB
EXPIRATION DATE: ABNORMAL
EXPIRATION DATE: ABNORMAL
GLUCOSE BLDC GLUCOMTR-MCNC: 162 MG/DL (ref 70–130)
HBA1C MFR BLD: 7.9 % (ref 4.5–5.7)
Lab: ABNORMAL
Lab: ABNORMAL

## 2024-04-29 PROCEDURE — 86341 ISLET CELL ANTIBODY: CPT | Performed by: PHYSICIAN ASSISTANT

## 2024-04-29 PROCEDURE — 99214 OFFICE O/P EST MOD 30 MIN: CPT | Performed by: PHYSICIAN ASSISTANT

## 2024-04-29 PROCEDURE — 82947 ASSAY GLUCOSE BLOOD QUANT: CPT | Performed by: PHYSICIAN ASSISTANT

## 2024-04-29 PROCEDURE — 83036 HEMOGLOBIN GLYCOSYLATED A1C: CPT | Performed by: PHYSICIAN ASSISTANT

## 2024-04-29 RX ORDER — ACYCLOVIR 400 MG/1
1 TABLET ORAL
Qty: 9 EACH | Refills: 3 | Status: SHIPPED | OUTPATIENT
Start: 2024-04-29 | End: 2024-05-03 | Stop reason: SDUPTHER

## 2024-04-29 RX ORDER — LANCETS
EACH MISCELLANEOUS
Qty: 200 EACH | Refills: 3 | Status: SHIPPED | OUTPATIENT
Start: 2024-04-29 | End: 2024-05-03 | Stop reason: SDUPTHER

## 2024-04-29 RX ORDER — BLOOD SUGAR DIAGNOSTIC
STRIP MISCELLANEOUS
Qty: 200 EACH | Refills: 3 | Status: SHIPPED | OUTPATIENT
Start: 2024-04-29 | End: 2024-05-03 | Stop reason: SDUPTHER

## 2024-04-29 RX ORDER — INSULIN ASPART 100 [IU]/ML
10 INJECTION, SOLUTION INTRAVENOUS; SUBCUTANEOUS
Qty: 9 ML | Refills: 3 | Status: SHIPPED | OUTPATIENT
Start: 2024-04-29 | End: 2024-05-03 | Stop reason: SDUPTHER

## 2024-04-29 NOTE — PATIENT INSTRUCTIONS
The 3 different insulin pump companies are Tandem, Omnipod and Medtronic. You can look on their websites to get more information.

## 2024-04-29 NOTE — ASSESSMENT & PLAN NOTE
Diabetes is improving with treatment.   Continue current treatment regimen.  Reminded to bring in blood sugar diary at next visit.  Recommended an ADA diet.  Regular aerobic exercise.  Discussed ways to avoid symptomatic hypoglycemia.    With recent DKA secondary to going without insulin, will recheck pancreatic autoantibodies. Stop jardiance. Further medication adjustments based on results.    Advised patient that since he does not do fingersticks, he needs to wear CGM to avoid hypoglycemia and help with determining that he is getting the right amount of insulin. Continue current dose of basal/bolus insulin.    Diabetes will be reassessed in 3 months

## 2024-04-29 NOTE — PROGRESS NOTES
Office Note      Date: 2024  Patient Name: Keny Rosen  MRN: [id number]  : 1970    Chief Complaint   Patient presents with    Diabetes     Type II    Hypothyroidism       History of Present Illness:     Keny Rosen is a 52 y.o. male who presents for Diabetes type 2. Diagnosed in: . Treated in past with oral agents. Current treatments: januvia, metformin, jardiance and lantus 22 units, novolog 5 units TID. Number of insulin shots per day: 4. Checks blood sugar rarely. Has low blood sugar: rare. Aspirin use: No -  . Statin use: Yes. ACE-I/ARB use: No -  .     Has not been wearing Dexcom for a couple of months. Has some sensors at home that he can put on.     Currently doing Lantus 22 units and Humalog 8-10 units TID.      Last A1c:  Hemoglobin A1C   Date Value Ref Range Status   2024 7.9 (A) 4.5 - 5.7 % Final       Changes in health since last visit: Pt went into DKA while on vacation at the end of March. He did not take his insulin with him. After 2 weeks, eventually developed n/v, progressive weakness. Was admitted to ICU in [place], [place] for 3-4 days. Now understands the importance of always having insulin..     DM Health Maintenance:  Ophtho:  2024  Monofilament / Foot exam: 1/3/24  Lipids/Statin: taking a statin with last FLP showing LDL DUE  SAVANAH: 1/3/24  TSH: 1/3/24 1.870  Aspirin: not indicated  ACE/ARB: not indicated     Subjective      Diabetic Complications:  Eyes: No  Kidneys: No  Feet: No  Heart: No    Review of Systems:   Review of Systems   Constitutional:  Negative for activity change, appetite change and fatigue.   Respiratory:  Negative for chest tightness and shortness of breath.    Cardiovascular:  Negative for chest pain and palpitations.   Genitourinary:  Negative for dysuria.   Musculoskeletal:  Negative for arthralgias and myalgias.   Neurological:  Negative for weakness.   Psychiatric/Behavioral:  Negative for dysphoric mood. The patient is not  "nervous/anxious.        The following portions of the patient's history were reviewed and updated as appropriate: allergies, current medications, past family history, past medical history, past social history, past surgical history, and problem list.    Objective     Visit Vitals  /58 (BP Location: Left arm, Patient Position: Sitting, Cuff Size: Adult)   Pulse 80   Ht 177.8 cm (70\")   Wt 69.4 kg (153 lb)   SpO2 100%   BMI 21.95 kg/m²           Physical Exam:  Physical Exam  Constitutional:       Appearance: He is well-developed.   HENT:      Head: Normocephalic and atraumatic.      Right Ear: External ear normal.      Left Ear: External ear normal.   Eyes:      Conjunctiva/sclera: Conjunctivae normal.   Cardiovascular:      Rate and Rhythm: Normal rate.   Pulmonary:      Effort: Pulmonary effort is normal.   Musculoskeletal:         General: Normal range of motion.      Cervical back: Normal range of motion.   Skin:     General: Skin is warm and dry.   Psychiatric:         Behavior: Behavior normal.          Assessment / Plan      Assessment & Plan:  Diagnoses and all orders for this visit:    1. Type 2 diabetes mellitus with hyperglycemia, with long-term current use of insulin (Primary)  Assessment & Plan:  Diabetes is improving with treatment.   Continue current treatment regimen.  Reminded to bring in blood sugar diary at next visit.  Recommended an ADA diet.  Regular aerobic exercise.  Discussed ways to avoid symptomatic hypoglycemia.    With recent DKA secondary to going without insulin, will recheck pancreatic autoantibodies. Stop jardiance. Further medication adjustments based on results.    Advised patient that since he does not do fingersticks, he needs to wear CGM to avoid hypoglycemia and help with determining that he is getting the right amount of insulin. Continue current dose of basal/bolus insulin.    Diabetes will be reassessed in 3 months    Orders:  -     POC Glucose, Blood  -     POC " Glycosylated Hemoglobin (Hb A1C)  -     Anti-islet Cell Antibody; Future  -     IA-2 Autoantibodies; Future  -     Glutamic Acid Decarboxylase; Future  -     ZNT8 Antibodies; Future  -     Continuous Glucose Sensor (Dexcom G7 Sensor) misc; Use 1 Device Every 10 (Ten) Days.  Dispense: 9 each; Refill: 3  -     insulin aspart (NovoLOG FlexPen) 100 UNIT/ML solution pen-injector sc pen; Inject 10 Units under the skin into the appropriate area as directed 3 (Three) Times a Day With Meals.  Dispense: 9 mL; Refill: 3  -     Accu-Chek Guide test strip; Use 4-5 times daily  Dispense: 200 each; Refill: 3  -     Accu-Chek FastClix Lancets misc; Use 4-5 times daily  Dispense: 200 each; Refill: 3  -     Anti-islet Cell Antibody  -     IA-2 Autoantibodies  -     Glutamic Acid Decarboxylase  -     ZNT8 Antibodies    Other orders  -     Insulin Pen Needle 32G X 6 MM misc; Use with insulin 4 times daily  Dispense: 360 each; Refill: 3        Return in about 3 months (around 7/29/2024) for Follow up with Dr. Chatman or me.    Portions of this note were completed with voice recognition program.  Electronically signed by Deborah Higgins PA-C  [facility] [place]  04/29/2024

## 2024-04-30 DIAGNOSIS — Z79.4 TYPE 2 DIABETES MELLITUS WITH HYPERGLYCEMIA, WITH LONG-TERM CURRENT USE OF INSULIN: ICD-10-CM

## 2024-04-30 DIAGNOSIS — E11.65 TYPE 2 DIABETES MELLITUS WITH HYPERGLYCEMIA, WITH LONG-TERM CURRENT USE OF INSULIN: ICD-10-CM

## 2024-04-30 RX ORDER — INSULIN ASPART 100 [IU]/ML
10 INJECTION, SOLUTION INTRAVENOUS; SUBCUTANEOUS
Qty: 9 ML | Refills: 3 | OUTPATIENT
Start: 2024-04-30

## 2024-04-30 NOTE — TELEPHONE ENCOUNTER
Caller: [facility] [address]  [address] - [phone] [facility] [phone] FX    Relationship: Pharmacy      Requested Prescriptions:   Requested Prescriptions     Pending Prescriptions Disp Refills    insulin aspart (NovoLOG FlexPen) 100 UNIT/ML solution pen-injector sc pen 9 mL 3     Sig: Inject 10 Units under the skin into the appropriate area as directed 3 (Three) Times a Day With Meals.        Pharmacy where request should be sent: [facility] - [place] [address]  [address] - [phone] [facility] [phone] FX     Last office visit with prescribing clinician: 4/29/2024   Last telemedicine visit with prescribing clinician: Visit date not found   Next office visit with prescribing clinician: 8/6/2024     Additional details provided by patient:     Does the patient have less than a 3 day supply:  [] Yes  [] No    Would you like a call back once the refill request has been completed: [] Yes [] No    If the office needs to give you a call back, can they leave a voicemail: [] Yes [] No    Kandy Martinez Rep   04/30/24 10:49 EDT

## 2024-04-30 NOTE — TELEPHONE ENCOUNTER
Rx Refill Note  Requested Prescriptions     Pending Prescriptions Disp Refills    insulin aspart (NovoLOG FlexPen) 100 UNIT/ML solution pen-injector sc pen 9 mL 3     Sig: Inject 10 Units under the skin into the appropriate area as directed 3 (Three) Times a Day With Meals.          Last office visit with prescribing clinician: 4/29/2024     Next office visit with prescribing clinician: 8/6/2024         Maureen Bingham MA  04/30/24, 16:23 EDT

## 2024-05-01 DIAGNOSIS — E11.65 TYPE 2 DIABETES MELLITUS WITH HYPERGLYCEMIA, WITH LONG-TERM CURRENT USE OF INSULIN: ICD-10-CM

## 2024-05-01 DIAGNOSIS — Z79.4 TYPE 2 DIABETES MELLITUS WITH HYPERGLYCEMIA, WITH LONG-TERM CURRENT USE OF INSULIN: ICD-10-CM

## 2024-05-02 LAB — PANC ISLET CELL AB TITR SER: NEGATIVE {TITER}

## 2024-05-02 RX ORDER — INSULIN ASPART 100 [IU]/ML
10 INJECTION, SOLUTION INTRAVENOUS; SUBCUTANEOUS
Qty: 9 ML | Refills: 3 | OUTPATIENT
Start: 2024-05-02

## 2024-05-02 RX ORDER — BLOOD SUGAR DIAGNOSTIC
STRIP MISCELLANEOUS
Qty: 200 EACH | Refills: 3 | OUTPATIENT
Start: 2024-05-02

## 2024-05-02 RX ORDER — LANCETS
EACH MISCELLANEOUS
Qty: 200 EACH | Refills: 3 | OUTPATIENT
Start: 2024-05-02

## 2024-05-02 RX ORDER — ACYCLOVIR 400 MG/1
1 TABLET ORAL
Qty: 9 EACH | Refills: 3 | OUTPATIENT
Start: 2024-05-02

## 2024-05-02 NOTE — TELEPHONE ENCOUNTER
Rx Refill Note  Requested Prescriptions     Pending Prescriptions Disp Refills    SITagliptin (Januvia) 100 MG tablet 90 tablet 1     Sig: Take 1 tablet by mouth Daily.    Continuous Glucose Sensor (Dexcom G7 Sensor) misc 9 each 3     Sig: Use 1 Device Every 10 (Ten) Days.    insulin aspart (NovoLOG FlexPen) 100 UNIT/ML solution pen-injector sc pen 9 mL 3     Sig: Inject 10 Units under the skin into the appropriate area as directed 3 (Three) Times a Day With Meals.    Accu-Chek Guide test strip 200 each 3     Sig: Use 4-5 times daily    Accu-Chek FastClix Lancets misc 200 each 3     Sig: Use 4-5 times daily    Insulin Pen Needle 32G X 6 MM misc 360 each 3     Sig: Use with insulin 4 times daily          Last office visit with prescribing clinician: 4/29/2024     Next office visit with prescribing clinician: 8/6/2024         Maureen Bingham MA  05/02/24, 08:54 EDT

## 2024-05-03 ENCOUNTER — TELEPHONE (OUTPATIENT)
Dept: ENDOCRINOLOGY | Facility: CLINIC | Age: 54
End: 2024-05-03
Payer: COMMERCIAL

## 2024-05-03 DIAGNOSIS — Z79.4 TYPE 2 DIABETES MELLITUS WITH HYPERGLYCEMIA, WITH LONG-TERM CURRENT USE OF INSULIN: ICD-10-CM

## 2024-05-03 DIAGNOSIS — E11.65 TYPE 2 DIABETES MELLITUS WITH HYPERGLYCEMIA, WITH LONG-TERM CURRENT USE OF INSULIN: ICD-10-CM

## 2024-05-03 LAB — GAD65 AB SER IA-ACNC: <5 U/ML (ref 0–5)

## 2024-05-03 RX ORDER — BLOOD SUGAR DIAGNOSTIC
STRIP MISCELLANEOUS
Qty: 450 EACH | Refills: 3 | Status: SHIPPED | OUTPATIENT
Start: 2024-05-03

## 2024-05-03 RX ORDER — ACYCLOVIR 400 MG/1
1 TABLET ORAL
Qty: 9 EACH | Refills: 3 | Status: SHIPPED | OUTPATIENT
Start: 2024-05-03

## 2024-05-03 RX ORDER — INSULIN ASPART 100 [IU]/ML
10 INJECTION, SOLUTION INTRAVENOUS; SUBCUTANEOUS
Qty: 9 ML | Refills: 3 | Status: SHIPPED | OUTPATIENT
Start: 2024-05-03

## 2024-05-03 RX ORDER — LANCETS
EACH MISCELLANEOUS
Qty: 450 EACH | Refills: 3 | Status: SHIPPED | OUTPATIENT
Start: 2024-05-03

## 2024-05-03 NOTE — TELEPHONE ENCOUNTER
----- Message from Emily DONAHUE sent at 5/3/2024  2:15 PM EDT -----  Regarding: Prescriptions   Contact: [phone]  Yes that is correct

## 2024-05-03 NOTE — TELEPHONE ENCOUNTER
----- Message from Emily DONAHUE sent at 5/1/2024  8:31 PM EDT -----  Regarding: Prescriptions   Contact: [phone]  I've corrected the pharmacy name and address, can you resend?

## 2024-05-06 LAB — ZNT8 AB SERPL IA-ACNC: <15 U/ML

## 2024-05-07 LAB — ISLET CELL512 AB SER-ACNC: <7.5 U/ML

## 2024-05-16 DIAGNOSIS — E11.65 TYPE 2 DIABETES MELLITUS WITH HYPERGLYCEMIA, WITH LONG-TERM CURRENT USE OF INSULIN: ICD-10-CM

## 2024-05-16 DIAGNOSIS — Z79.4 TYPE 2 DIABETES MELLITUS WITH HYPERGLYCEMIA, WITH LONG-TERM CURRENT USE OF INSULIN: ICD-10-CM

## 2024-05-16 NOTE — TELEPHONE ENCOUNTER
The only place that will cover this glucose monitor is a place called [facility]. Can you send the prescription for the dexcom to [email or web address] ? The phone is [phone] and fax number is [phone]     Reill pended to print

## 2024-05-17 RX ORDER — ACYCLOVIR 400 MG/1
1 TABLET ORAL
Qty: 9 EACH | Refills: 3 | Status: SHIPPED | OUTPATIENT
Start: 2024-05-17

## 2024-07-31 DIAGNOSIS — Z79.4 TYPE 2 DIABETES MELLITUS WITH HYPERGLYCEMIA, WITH LONG-TERM CURRENT USE OF INSULIN: ICD-10-CM

## 2024-07-31 DIAGNOSIS — E11.65 TYPE 2 DIABETES MELLITUS WITH HYPERGLYCEMIA, WITH LONG-TERM CURRENT USE OF INSULIN: ICD-10-CM

## 2024-07-31 RX ORDER — INSULIN ASPART 100 [IU]/ML
INJECTION, SOLUTION INTRAVENOUS; SUBCUTANEOUS
Qty: 9 ML | Refills: 3 | OUTPATIENT
Start: 2024-07-31

## 2024-07-31 NOTE — TELEPHONE ENCOUNTER
Rx Refill Note  Requested Prescriptions     Pending Prescriptions Disp Refills    NovoLOG FlexPen 100 UNIT/ML solution pen-injector sc pen [Pharmacy Med Name: Novolog Flexpen U-100 Insulin aspart 100 unit/mL (3 mL) subcutaneous] 9 mL 3     Sig: INJECT 10 UNITS UNDER THE SKIN INTO THE APPROPIATE AREA AS DIRECTED THREE TIMES DAILY WITH MEALS      Last office visit with prescribing clinician: 4/29/2024     Next office visit with prescribing clinician: 8/6/2024

## 2024-08-06 ENCOUNTER — OFFICE VISIT (OUTPATIENT)
Dept: ENDOCRINOLOGY | Facility: CLINIC | Age: 54
End: 2024-08-06
Payer: COMMERCIAL

## 2024-08-06 VITALS
BODY MASS INDEX: 23.62 KG/M2 | WEIGHT: 165 LBS | OXYGEN SATURATION: 97 % | HEIGHT: 70 IN | DIASTOLIC BLOOD PRESSURE: 80 MMHG | SYSTOLIC BLOOD PRESSURE: 122 MMHG | HEART RATE: 70 BPM

## 2024-08-06 DIAGNOSIS — Z79.4 TYPE 2 DIABETES MELLITUS WITH HYPERGLYCEMIA, WITH LONG-TERM CURRENT USE OF INSULIN: Primary | ICD-10-CM

## 2024-08-06 DIAGNOSIS — E11.65 TYPE 2 DIABETES MELLITUS WITH HYPERGLYCEMIA, WITH LONG-TERM CURRENT USE OF INSULIN: Primary | ICD-10-CM

## 2024-08-06 LAB
EXPIRATION DATE: ABNORMAL
EXPIRATION DATE: ABNORMAL
GLUCOSE BLDC GLUCOMTR-MCNC: 255 MG/DL (ref 70–130)
HBA1C MFR BLD: 9.2 % (ref 4.5–5.7)
Lab: ABNORMAL
Lab: ABNORMAL

## 2024-08-06 PROCEDURE — 83036 HEMOGLOBIN GLYCOSYLATED A1C: CPT | Performed by: PHYSICIAN ASSISTANT

## 2024-08-06 PROCEDURE — 99214 OFFICE O/P EST MOD 30 MIN: CPT | Performed by: PHYSICIAN ASSISTANT

## 2024-08-06 PROCEDURE — 95251 CONT GLUC MNTR ANALYSIS I&R: CPT | Performed by: PHYSICIAN ASSISTANT

## 2024-08-06 NOTE — ASSESSMENT & PLAN NOTE
Diabetes is worsening.   Medication changes per orders.  Recommended an ADA diet.  Regular aerobic exercise.    Patient will switch insulin administration to his abdomen and rotate injection sites. Will find a way to keep insulin refrigerated during the day, so that it is not losing efficacy.      Increase Lantus to 26 units qhs. Reduce Novolog to 12 units before meals d/t lows between meals.     Discussed ways to reduce carbohydrates in diet, even when eating fast food.     Pt will have us check Dexcom remotely if still having trouble with insulin efficacy.    Diabetes will be reassessed in 3 months

## 2024-08-06 NOTE — PROGRESS NOTES
Office Note      Date: 2024  Patient Name: Keny Rosen  MRN: [id number]  : 1970    Chief Complaint   Patient presents with    Diabetes       History of Present Illness:   Keny Rosen is a 54 y.o. male who presents for Diabetes type 2.   Current RX: Lantus 22-24 units; Novolog 15 units with meals; metformin  mg daily    Bg checks are done: continuously  Hypoglycemia : frequently between breakfast and lunch    Pt does not feel like the insulin is working as well as it used to. He has been administering insulin in his upper legs, often distal upper thigh. Has not tried switching to administering in his abdomen. Also admits that he leaves Novolog in his truck throughout the day since travels frequently. Also eats fast food for most meals.     Last A1c:  Hemoglobin A1C   Date Value Ref Range Status   2024 9.2 (A) 4.5 - 5.7 % Final     The last 2 weeks of CGM data are reviewed.  2 % are low  21 % are in range  28 % are 180-250  50 % are >250  GMI: n/a  The glycemic pattern shows: glucose averages are above target range, except trending down from 12-3 and around 6 pm    Changes in health since last visit: done.     DM Health Maintenance:  Ophtho:  2024  Monofilament / Foot exam: 1/3/24  Lipids/Statin: taking a statin with last FLP showing LDL DUE  SAVANAH: 1/3/24  TSH: 1/3/24 1.870  Aspirin: not indicated  ACE/ARB: not indicated       Subjective     Diabetic Complications:  Eyes: No  Kidneys: No  Feet: No  Heart: No         Review of Systems:   Review of Systems   Constitutional:  Negative for activity change, appetite change and fatigue.   Respiratory:  Negative for chest tightness and shortness of breath.    Gastrointestinal:  Negative for abdominal pain.   Musculoskeletal:  Negative for myalgias.   Psychiatric/Behavioral:  The patient is not nervous/anxious.        The following portions of the patient's history were reviewed and updated as appropriate: allergies, current  "medications, past family history, past medical history, past social history, past surgical history, and problem list.    Objective     Visit Vitals  /80   Pulse 70   Ht 177.8 cm (70\")   Wt 74.8 kg (165 lb)   SpO2 97%   BMI 23.68 kg/m²           Physical Exam:  Physical Exam  Constitutional:       Appearance: He is well-developed.   HENT:      Head: Normocephalic and atraumatic.      Right Ear: External ear normal.      Left Ear: External ear normal.   Eyes:      Conjunctiva/sclera: Conjunctivae normal.   Cardiovascular:      Rate and Rhythm: Normal rate.   Pulmonary:      Effort: Pulmonary effort is normal.   Musculoskeletal:         General: Normal range of motion.      Cervical back: Normal range of motion.   Skin:     General: Skin is warm and dry.   Psychiatric:         Behavior: Behavior normal.          Assessment / Plan      Assessment & Plan:  Diagnoses and all orders for this visit:    1. Type 2 diabetes mellitus with hyperglycemia, with long-term current use of insulin (Primary)  Assessment & Plan:  Diabetes is worsening.   Medication changes per orders.  Recommended an ADA diet.  Regular aerobic exercise.    Patient will switch insulin administration to his abdomen and rotate injection sites. Will find a way to keep insulin refrigerated during the day, so that it is not losing efficacy.      Increase Lantus to 26 units qhs. Reduce Novolog to 12 units before meals d/t lows between meals.     Discussed ways to reduce carbohydrates in diet, even when eating fast food.     Pt will have us check Dexcom remotely if still having trouble with insulin efficacy.    Diabetes will be reassessed in 3 months    Orders:  -     POC Glucose, Blood  -     POC Glycosylated Hemoglobin (Hb A1C)        Return in about 3 months (around 11/6/2024) for Follow up.    Portions of this note were completed with voice recognition program.  Electronically signed by Deborah Higgins PA-C  [facility] [place]  08/06/2024  "

## 2024-08-14 DIAGNOSIS — Z79.4 TYPE 2 DIABETES MELLITUS WITH HYPERGLYCEMIA, WITH LONG-TERM CURRENT USE OF INSULIN: ICD-10-CM

## 2024-08-14 DIAGNOSIS — E11.65 TYPE 2 DIABETES MELLITUS WITH HYPERGLYCEMIA, WITH LONG-TERM CURRENT USE OF INSULIN: ICD-10-CM

## 2024-08-15 RX ORDER — INSULIN ASPART 100 [IU]/ML
INJECTION, SOLUTION INTRAVENOUS; SUBCUTANEOUS
Qty: 9 ML | Refills: 3 | Status: SHIPPED | OUTPATIENT
Start: 2024-08-15

## 2024-08-15 NOTE — TELEPHONE ENCOUNTER
Rx Refill Note  Requested Prescriptions     Pending Prescriptions Disp Refills    NovoLOG FlexPen 100 UNIT/ML solution pen-injector sc pen [Pharmacy Med Name: Novolog Flexpen U-100 Insulin aspart 100 unit/mL (3 mL) subcutaneous] 9 mL 3     Sig: INJECT 10 UNITS UNDER THE SKIN INTO THE APPROPIATE AREA AS DIRECTED THREE TIMES DAILY WITH MEALS      Last office visit with prescribing clinician: 8/6/2024   Last telemedicine visit with prescribing clinician: Visit date not found   Next office visit with prescribing clinician: 11/4/2024                         Would you like a call back once the refill request has been completed: [] Yes [] No    If the office needs to give you a call back, can they leave a voicemail: [] Yes [] No    Camden Hinds MA  08/15/24, 12:01 EDT

## 2024-11-04 ENCOUNTER — OFFICE VISIT (OUTPATIENT)
Dept: ENDOCRINOLOGY | Facility: CLINIC | Age: 54
End: 2024-11-04
Payer: COMMERCIAL

## 2024-11-04 VITALS
HEIGHT: 70 IN | HEART RATE: 83 BPM | BODY MASS INDEX: 23.48 KG/M2 | OXYGEN SATURATION: 97 % | SYSTOLIC BLOOD PRESSURE: 114 MMHG | DIASTOLIC BLOOD PRESSURE: 66 MMHG | WEIGHT: 164 LBS

## 2024-11-04 DIAGNOSIS — E11.65 TYPE 2 DIABETES MELLITUS WITH HYPERGLYCEMIA, WITH LONG-TERM CURRENT USE OF INSULIN: Primary | ICD-10-CM

## 2024-11-04 DIAGNOSIS — Z79.4 TYPE 2 DIABETES MELLITUS WITH HYPERGLYCEMIA, WITH LONG-TERM CURRENT USE OF INSULIN: Primary | ICD-10-CM

## 2024-11-04 PROBLEM — E11.649 TYPE 2 DIABETES MELLITUS WITH HYPOGLYCEMIA WITHOUT COMA, WITH LONG-TERM CURRENT USE OF INSULIN: Status: RESOLVED | Noted: 2022-11-23 | Resolved: 2024-11-04

## 2024-11-04 LAB
EXPIRATION DATE: ABNORMAL
EXPIRATION DATE: ABNORMAL
GLUCOSE BLDC GLUCOMTR-MCNC: 238 MG/DL (ref 70–130)
HBA1C MFR BLD: 9.9 % (ref 4.5–5.7)
Lab: ABNORMAL
Lab: ABNORMAL

## 2024-11-04 PROCEDURE — 95251 CONT GLUC MNTR ANALYSIS I&R: CPT | Performed by: PHYSICIAN ASSISTANT

## 2024-11-04 PROCEDURE — 83036 HEMOGLOBIN GLYCOSYLATED A1C: CPT | Performed by: PHYSICIAN ASSISTANT

## 2024-11-04 PROCEDURE — 99214 OFFICE O/P EST MOD 30 MIN: CPT | Performed by: PHYSICIAN ASSISTANT

## 2024-11-04 RX ORDER — INSULIN GLARGINE 100 [IU]/ML
30 INJECTION, SOLUTION SUBCUTANEOUS DAILY
COMMUNITY
Start: 2024-10-04

## 2024-11-04 RX ORDER — INSULIN ASPART INJECTION 100 [IU]/ML
INJECTION, SOLUTION SUBCUTANEOUS
Qty: 45 ML | Refills: 1 | Status: SHIPPED | OUTPATIENT
Start: 2024-11-04

## 2024-11-04 RX ORDER — INSULIN ASPART 100 [IU]/ML
12 INJECTION, SOLUTION INTRAVENOUS; SUBCUTANEOUS
COMMUNITY
Start: 2024-08-15

## 2024-11-04 NOTE — PATIENT INSTRUCTIONS
Increase Lantus to 26 units.  Change from Novolog to Fiasp if covered by insurance.   Change short-acting insulin dose to 6 units with low carb meal (chicken wings), 12 units with regular meal and 14-16 units with high carb meal (fast food).  You will be called about scheduling diabetes education to learn about carb counting.  Start recording meals and insulin amounts in Dexcom.

## 2024-11-04 NOTE — ASSESSMENT & PLAN NOTE
Diabetes is worsening.   Medication changes per orders.  Reminded to bring in blood sugar diary at next visit.  Recommended an ADA diet.  Regular aerobic exercise.  Discussed ways to avoid symptomatic hypoglycemia.  Reminded to get yearly retinal exam.    Review of CGM shows persistent hyperglycemia with occasional hypoglycemia it seems to be related to NovoLog administration.    Will have patient increase Lantus to 26 units daily.      Change from Novolog to Fiasp, if covered by insurance, to see if more rapid acting insulin will work better at mealtime. Change short-acting insulin dose to 6 units with low carb meal (chicken wings), 12 units with regular meal and 14-16 units with high carb meal (fast food).  Asked patient to start recording meals and insulin doses on Dexcom so we can develop a carb ratio.    Refer patient diet diabetes education for a refresher in healthy food choices and carbohydrate counting.    Diabetes will be reassessed in 3 months

## 2024-11-04 NOTE — PROGRESS NOTES
"     Office Note      Date: 2024  Patient Name: Keny Rosen  MRN: [id number]  : 1970    Chief Complaint   Patient presents with    Diabetes       History of Present Illness:   Keny Rosen is a 54 y.o. male who presents for Diabetes type 2.   Diagnosed: ~  Current RX: Lantus 24 units, Novolog 12 units     Bg checks are done: continuously with Dexcom  Hypoglycemia : Occasional    Patient notes that he finds that the novolog does not seem to work very quickly    Last A1c:  Hemoglobin A1C   Date Value Ref Range Status   2024 9.9 (A) 4.5 - 5.7 % Final     The last 2 weeks of CGM data are reviewed.  3 % are low  10 % are in range  18 % are 180-250  70 % are >250  GMI: n/a  The glycemic pattern shows: glucose averages are constantly in hyperglycemia range, around 300; occasional low readings      Changes in health since last visit: occasionally.     DM Health Maintenance:  Ophtho:  2024  Monofilament / Foot exam: 1/3/24  Lipids/Statin: taking a statin with last FLP showing LDL DUE  SAVANAH: 1/3/24  TSH: 1/3/24 1.870  Aspirin: not indicated  ACE/ARB: not indicated     Diabetic Complications:  Eyes: No  Kidneys: No  Feet: No  Heart: No       Subjective          Review of Systems:   Review of Systems   Constitutional:  Negative for activity change, appetite change and fatigue.   Respiratory:  Negative for chest tightness and shortness of breath.    Gastrointestinal:  Negative for abdominal pain.   Musculoskeletal:  Negative for myalgias.   Neurological:  Negative for numbness.   Psychiatric/Behavioral:  The patient is not nervous/anxious.        The following portions of the patient's history were reviewed and updated as appropriate: allergies, current medications, past family history, past medical history, past social history, past surgical history, and problem list.    Objective     Visit Vitals  /66   Pulse 83   Ht 177.8 cm (70\")   Wt 74.4 kg (164 lb)   SpO2 97%   BMI 23.53 kg/m² "           Physical Exam:  Physical Exam  Constitutional:       Appearance: He is well-developed.   HENT:      Head: Normocephalic and atraumatic.      Right Ear: External ear normal.      Left Ear: External ear normal.   Eyes:      Conjunctiva/sclera: Conjunctivae normal.   Cardiovascular:      Rate and Rhythm: Normal rate.   Pulmonary:      Effort: Pulmonary effort is normal.   Musculoskeletal:         General: Normal range of motion.      Cervical back: Normal range of motion.   Skin:     General: Skin is warm and dry.   Psychiatric:         Behavior: Behavior normal.          Assessment / Plan      Assessment & Plan:  Diagnoses and all orders for this visit:    1. Type 2 diabetes mellitus with hyperglycemia, with long-term current use of insulin (Primary)  Assessment & Plan:  Diabetes is worsening.   Medication changes per orders.  Reminded to bring in blood sugar diary at next visit.  Recommended an ADA diet.  Regular aerobic exercise.  Discussed ways to avoid symptomatic hypoglycemia.  Reminded to get yearly retinal exam.    Review of CGM shows persistent hyperglycemia with occasional hypoglycemia it seems to be related to NovoLog administration.    Will have patient increase Lantus to 26 units daily.      Change from Novolog to Fiasp, if covered by insurance, to see if more rapid acting insulin will work better at mealtime. Change short-acting insulin dose to 6 units with low carb meal (chicken wings), 12 units with regular meal and 14-16 units with high carb meal (fast food).  Asked patient to start recording meals and insulin doses on Dexcom so we can develop a carb ratio.    Refer patient diet diabetes education for a refresher in healthy food choices and carbohydrate counting.    Diabetes will be reassessed in 3 months    Orders:  -     POC Glucose, Blood  -     POC Glycosylated Hemoglobin (Hb A1C)  -     Insulin Aspart, w/Niacinamide, (Fiasp FlexTouch) 100 UNIT/ML solution pen-injector; Use 6-16 units  before meals as directed.  Dispense: 45 mL; Refill: 1  -     Ambulatory Referral to Diabetic Education        Return in about 3 months (around 2/4/2025) for Follow up with fasting labs.    Portions of this note were completed with voice recognition program.  Electronically signed by Deborah Higgins PA-C  [facility] [place] [facility]  11/04/2024

## 2024-11-13 DIAGNOSIS — E11.65 TYPE 2 DIABETES MELLITUS WITH HYPERGLYCEMIA: ICD-10-CM

## 2024-11-13 RX ORDER — INSULIN ASPART 100 [IU]/ML
INJECTION, SOLUTION INTRAVENOUS; SUBCUTANEOUS
Qty: 9 ML | Refills: 2 | Status: SHIPPED | OUTPATIENT
Start: 2024-11-13

## 2024-11-13 NOTE — TELEPHONE ENCOUNTER
Rx Refill Note  Requested Prescriptions     Pending Prescriptions Disp Refills    Insulin Aspart FlexPen 100 UNIT/ML solution pen-injector [Pharmacy Med Name: insulin aspart (U-100) 100 unit/mL (3 mL) subcutaneous pen] 9 mL 2     Sig: INJECT 10 UNITS UNDER THE SKIN INTO THE APPROPIATE AREA AS DIRECTED THREE TIMES DAILY WITH MEALS          Last office visit with prescribing clinician: 11/4/2024     Next office visit with prescribing clinician: 2/4/2025   }    Sascha Vences MA  11/13/24, 08:13 EST

## 2024-11-19 ENCOUNTER — TELEPHONE (OUTPATIENT)
Dept: ENDOCRINOLOGY | Facility: CLINIC | Age: 54
End: 2024-11-19
Payer: COMMERCIAL

## 2024-11-20 ENCOUNTER — TELEPHONE (OUTPATIENT)
Dept: ENDOCRINOLOGY | Facility: CLINIC | Age: 54
End: 2024-11-20
Payer: COMMERCIAL

## 2024-11-20 DIAGNOSIS — E11.65 TYPE 2 DIABETES MELLITUS WITH HYPERGLYCEMIA: ICD-10-CM

## 2024-11-20 RX ORDER — INSULIN ASPART 100 [IU]/ML
10 INJECTION, SOLUTION INTRAVENOUS; SUBCUTANEOUS 3 TIMES DAILY
Qty: 9 ML | Refills: 2 | Status: SHIPPED | OUTPATIENT
Start: 2024-11-20

## 2024-12-19 ENCOUNTER — HOSPITAL ENCOUNTER (OUTPATIENT)
Dept: DIABETES SERVICES | Facility: HOSPITAL | Age: 54
Setting detail: RECURRING SERIES
Discharge: HOME | End: 2024-12-19
Payer: COMMERCIAL

## 2024-12-19 PROCEDURE — G0109 DIAB MANAGE TRN IND/GROUP: HCPCS | Performed by: REGISTERED NURSE

## 2024-12-19 NOTE — CONSULTS
Mr. Rosen attended the scheduled 2-hour diabetes education class with RN and RD in person. Please see media tab for assessment and notes if you use EP[employer]. If you are not an [employer] user a copy of patient's assessment and notes will be sent per routine. Thank you.

## 2024-12-29 DIAGNOSIS — Z79.4 TYPE 2 DIABETES MELLITUS WITH HYPERGLYCEMIA, WITH LONG-TERM CURRENT USE OF INSULIN: ICD-10-CM

## 2024-12-29 DIAGNOSIS — E11.65 TYPE 2 DIABETES MELLITUS WITH HYPERGLYCEMIA, WITH LONG-TERM CURRENT USE OF INSULIN: ICD-10-CM

## 2024-12-30 RX ORDER — ACYCLOVIR 400 MG/1
1 TABLET ORAL
Qty: 9 EACH | Refills: 3 | Status: SHIPPED | OUTPATIENT
Start: 2024-12-30 | End: 2024-12-30 | Stop reason: SDUPTHER

## 2024-12-30 RX ORDER — ACYCLOVIR 400 MG/1
1 TABLET ORAL
Qty: 9 EACH | Refills: 3 | Status: SHIPPED | OUTPATIENT
Start: 2024-12-30

## 2024-12-30 NOTE — TELEPHONE ENCOUNTER
Rx Refill Note  Requested Prescriptions     Pending Prescriptions Disp Refills    Continuous Glucose Sensor (Dexcom G7 Sensor) misc 9 each 3     Sig: Use 1 Device Every 10 (Ten) Days. Dx e11.65 on insulin          Last office visit with prescribing clinician: 11/4/2024     Next office visit with prescribing clinician: 2/4/2025   }    Sascha Vences MA  12/30/24, 09:18 EST

## 2025-01-10 ENCOUNTER — PATIENT MESSAGE (OUTPATIENT)
Dept: ENDOCRINOLOGY | Facility: CLINIC | Age: 55
End: 2025-01-10
Payer: COMMERCIAL

## 2025-01-10 DIAGNOSIS — Z79.4 TYPE 2 DIABETES MELLITUS WITH HYPERGLYCEMIA, WITH LONG-TERM CURRENT USE OF INSULIN: ICD-10-CM

## 2025-01-10 DIAGNOSIS — E11.65 TYPE 2 DIABETES MELLITUS WITH HYPERGLYCEMIA, WITH LONG-TERM CURRENT USE OF INSULIN: ICD-10-CM

## 2025-01-11 DIAGNOSIS — E11.65 TYPE 2 DIABETES MELLITUS WITH HYPERGLYCEMIA, WITH LONG-TERM CURRENT USE OF INSULIN: ICD-10-CM

## 2025-01-11 DIAGNOSIS — Z79.4 TYPE 2 DIABETES MELLITUS WITH HYPERGLYCEMIA, WITH LONG-TERM CURRENT USE OF INSULIN: ICD-10-CM

## 2025-01-13 RX ORDER — BLOOD SUGAR DIAGNOSTIC
STRIP MISCELLANEOUS
Qty: 450 EACH | Refills: 3 | Status: SHIPPED | OUTPATIENT
Start: 2025-01-13

## 2025-01-13 NOTE — TELEPHONE ENCOUNTER
Rx Refill Note  Requested Prescriptions     Pending Prescriptions Disp Refills    Accu-Chek Jillian Plus test strip [Pharmacy Med Name: Accu-Chek Jillian Plus test strips]  3     Sig: USE FOUR TO FIVE TIMES DAILY      Last office visit with prescribing clinician: 11/4/2024   Last telemedicine visit with prescribing clinician: Visit date not found   Next office visit with prescribing clinician: 2/4/2025                         Would you like a call back once the refill request has been completed: [] Yes [] No    If the office needs to give you a call back, can they leave a voicemail: [] Yes [] No    Camden Hinds MA  01/13/25, 13:40 EST

## 2025-01-21 DIAGNOSIS — E11.65 TYPE 2 DIABETES MELLITUS WITH HYPERGLYCEMIA: ICD-10-CM

## 2025-01-21 RX ORDER — ACYCLOVIR 400 MG/1
1 TABLET ORAL
Qty: 9 EACH | Refills: 3 | Status: SHIPPED | OUTPATIENT
Start: 2025-01-21

## 2025-01-21 RX ORDER — INSULIN ASPART 100 [IU]/ML
INJECTION, SOLUTION INTRAVENOUS; SUBCUTANEOUS
Qty: 9 ML | Refills: 2 | Status: SHIPPED | OUTPATIENT
Start: 2025-01-21

## 2025-01-21 NOTE — TELEPHONE ENCOUNTER
Rx Refill Note  Requested Prescriptions     Pending Prescriptions Disp Refills    Insulin Aspart FlexPen 100 UNIT/ML solution pen-injector [Pharmacy Med Name: insulin aspart (U-100) 100 unit/mL (3 mL) subcutaneous pen] 9 mL 2     Sig: INJECT 10 UNITS UNDER THE SKIN INTO THE APPROPIATE AREA AS DIRECTED THREE TIMES DAILY WITH MEALS          Last office visit with prescribing clinician: 11/4/2024     Next office visit with prescribing clinician: 2/4/2025   }    Sascha Vences MA  01/21/25, 09:10 EST

## 2025-01-23 ENCOUNTER — HOSPITAL ENCOUNTER (OUTPATIENT)
Dept: DIABETES SERVICES | Facility: HOSPITAL | Age: 55
End: 2025-01-23
Payer: COMMERCIAL

## 2025-02-04 ENCOUNTER — OFFICE VISIT (OUTPATIENT)
Dept: ENDOCRINOLOGY | Facility: CLINIC | Age: 55
End: 2025-02-04
Payer: COMMERCIAL

## 2025-02-04 VITALS
OXYGEN SATURATION: 97 % | BODY MASS INDEX: 24.54 KG/M2 | HEIGHT: 70 IN | HEART RATE: 78 BPM | DIASTOLIC BLOOD PRESSURE: 72 MMHG | WEIGHT: 171.4 LBS | SYSTOLIC BLOOD PRESSURE: 116 MMHG

## 2025-02-04 DIAGNOSIS — E11.65 TYPE 2 DIABETES MELLITUS WITH HYPERGLYCEMIA, WITH LONG-TERM CURRENT USE OF INSULIN: Primary | ICD-10-CM

## 2025-02-04 DIAGNOSIS — Z79.4 TYPE 2 DIABETES MELLITUS WITH HYPERGLYCEMIA, WITH LONG-TERM CURRENT USE OF INSULIN: ICD-10-CM

## 2025-02-04 DIAGNOSIS — E11.65 TYPE 2 DIABETES MELLITUS WITH HYPERGLYCEMIA, WITH LONG-TERM CURRENT USE OF INSULIN: ICD-10-CM

## 2025-02-04 DIAGNOSIS — Z79.4 TYPE 2 DIABETES MELLITUS WITH HYPERGLYCEMIA, WITH LONG-TERM CURRENT USE OF INSULIN: Primary | ICD-10-CM

## 2025-02-04 LAB
EXPIRATION DATE: ABNORMAL
EXPIRATION DATE: ABNORMAL
GLUCOSE BLDC GLUCOMTR-MCNC: 234 MG/DL (ref 70–130)
HBA1C MFR BLD: 8.1 % (ref 4.5–5.7)
Lab: ABNORMAL
Lab: ABNORMAL

## 2025-02-04 NOTE — PROGRESS NOTES
Office Note      Date: 2025  Patient Name: Keny Rosen  MRN: [id number]  : 1970    Chief Complaint   Patient presents with    Diabetes     Type II       History of Present Illness:   Keny Rosen is a 54 y.o. male who presents for Diabetes type 2.   Diagnosed: ~  Current RX: Lantus 26 units, Novolog 12 unit, metformin ER 1000 mg BID, Januvia 100 mg     Bg checks are done: continuously with Dexcom  Hypoglycemia : occasionally, at night recently    Patient is not sure if he got Fiasp, did not get an insulin that looked any different than his Novolog. Did not change the dose of Novolog much. He is still doing 10-12 units with each meal    He did go to a diabetes education class and did find it helpful to learn about nutrition and carb counting.     Last A1c:  Hemoglobin A1C   Date Value Ref Range Status   2025 8.1 (A) 4.5 - 5.7 % Final     The last 2 weeks of CGM data are reviewed.  3 % are low  55 % are in range  23 % are 180-250  18 % are >250  GMI: 7.5  The glycemic pattern shows: Hyperglycemia after morning meal, hypoglycemia after lunch and some hyperglycemia and early evening trending down to normal by morning    Changes in health since last visit: none.     DM Health Maintenance:  Ophtho:  scheduled for 25   Monofilament / Foot exam: 25   Lipids/Statin: taking a statin with last FLP showing LDL ordered. 25   SAVANAH: 1/3/24  TSH: 1/3/24 1.870  Aspirin: not indicated  ACE/ARB: not indicated      Diabetic Complications:  Eyes: No  Kidneys: No  Feet: No  Heart: No       Subjective          Review of Systems:   Review of Systems    The following portions of the patient's history were reviewed and updated as appropriate: allergies, current medications, past family history, past medical history, past social history, past surgical history, and problem list.    Objective     Visit Vitals  /72 (BP Location: Left arm, Patient Position: Sitting, Cuff Size: Adult)  "  Pulse 78   Ht 177.8 cm (70\")   Wt 77.7 kg (171 lb 6.4 oz)   SpO2 97%   BMI 24.59 kg/m²           Physical Exam:  Physical Exam  Constitutional:       Appearance: He is well-developed.   HENT:      Head: Normocephalic and atraumatic.      Right Ear: External ear normal.      Left Ear: External ear normal.   Eyes:      Conjunctiva/sclera: Conjunctivae normal.   Cardiovascular:      Rate and Rhythm: Normal rate and regular rhythm.      Pulses:           Dorsalis pedis pulses are 2+ on the right side and 2+ on the left side.        Posterior tibial pulses are 2+ on the right side and 2+ on the left side.   Pulmonary:      Effort: Pulmonary effort is normal.      Breath sounds: Normal breath sounds.   Musculoskeletal:         General: Normal range of motion.      Cervical back: Normal range of motion.   Feet:      Right foot:      Protective Sensation: 10 sites tested.  10 sites sensed.      Skin integrity: Skin integrity normal.      Toenail Condition: Right toenails are normal.      Left foot:      Protective Sensation: 10 sites tested.  10 sites sensed.      Skin integrity: Skin integrity normal.      Toenail Condition: Left toenails are normal.      Comments: Diabetic Foot Exam Performed and Monofilament Test Performed      Skin:     General: Skin is warm and dry.   Psychiatric:         Behavior: Behavior normal.          Assessment / Plan      Assessment & Plan:  Diagnoses and all orders for this visit:    1. Type 2 diabetes mellitus with hyperglycemia, with long-term current use of insulin (Primary)  Assessment & Plan:  Diabetes is worsening.   Continue current treatment regimen.  Regular aerobic exercise.    Patient is going to inquire with pharmacy about why he did not receive Fiasp that was sent in at last appointment. If possible, he will switch from Novolog to Fiasp.    CGM report shows hyperglycemia after breakfast meal. Will have patient increase prandial insulin dose from 12 to 14 units. Decrease lunch " dose to 10 units to avoid afternoon lows. Increase evening meal dose to 14 units.    Due to recent overnight hypoglycemia, decrease Lantus to 24 units daily.    Discussed pump options, including Ilet bionic pancreas (to avoid having to learn carb counting) or Omnipod (for tubeless option).    Diabetes will be reassessed in 3 months    Orders:  -     POC Glucose, Blood  -     POC Glycosylated Hemoglobin (Hb A1C)  -     Comprehensive Metabolic Panel; Future  -     Lipid Panel; Future  -     TSH Rfx On Abnormal To Free T4; Future  -     Microalbumin / Creatinine Urine Ratio - Urine, Clean Catch; Future  -     C-Peptide; Future        Return in about 3 months (around 5/4/2025) for Follow up.    Portions of this note were completed with voice recognition program.  Electronically signed by Deborah Higgins PA-C  [facility]  02/04/2025

## 2025-02-04 NOTE — ASSESSMENT & PLAN NOTE
Diabetes is worsening.   Continue current treatment regimen.  Regular aerobic exercise.    Patient is going to inquire with pharmacy about why he did not receive Fiasp that was sent in at last appointment. If possible, he will switch from Novolog to Fiasp.    CGM report shows hyperglycemia after breakfast meal. Will have patient increase prandial insulin dose from 12 to 14 units. Decrease lunch dose to 10 units to avoid afternoon lows. Increase evening meal dose to 14 units.    Due to recent overnight hypoglycemia, decrease Lantus to 24 units daily.    Discussed pump options, including Ilet bionic pancreas (to avoid having to learn carb counting) or Omnipod (for tubeless option).    Diabetes will be reassessed in 3 months

## 2025-02-05 RX ORDER — INSULIN ASPART INJECTION 100 [IU]/ML
INJECTION, SOLUTION SUBCUTANEOUS
Qty: 45 ML | Refills: 1 | Status: SHIPPED | OUTPATIENT
Start: 2025-02-05

## 2025-02-05 NOTE — TELEPHONE ENCOUNTER
Rx Refill Note  Requested Prescriptions     Pending Prescriptions Disp Refills    Insulin Aspart, w/Niacinamide, (Fiasp FlexTouch) 100 UNIT/ML solution pen-injector 45 mL 0     Sig: Use 6-16 units before meals as directed.          Last office visit with prescribing clinician: 2/4/2025     Next office visit with prescribing clinician: 5/8/2025   }    Sascha Vences MA  02/05/25, 08:00 EST

## 2025-02-17 RX ORDER — PEN NEEDLE, DIABETIC 32GX 5/32"
NEEDLE, DISPOSABLE MISCELLANEOUS
Qty: 400 EACH | Refills: 0 | Status: SHIPPED | OUTPATIENT
Start: 2025-02-17

## 2025-02-17 NOTE — TELEPHONE ENCOUNTER
"Rx Refill Note  Requested Prescriptions     Pending Prescriptions Disp Refills    Insulin Pen Needle (Sure Comfort Pen Needles) 32G X 6 MM misc [Pharmacy Med Name: Sure Comfort Pen Needle 32 gauge x 1/4\"] 400 each 0     Sig: USE WITH INSULIN FOUR TIMES DAILY          Last office visit with prescribing clinician: 2/4/2025     Next office visit with prescribing clinician: 5/8/2025   }    Sascha Vences MA  02/17/25, 10:20 EST  "

## 2025-04-08 RX ORDER — LEVOTHYROXINE SODIUM 75 UG/1
75 TABLET ORAL DAILY
Qty: 90 TABLET | Refills: 0
Start: 2025-04-08

## 2025-04-08 NOTE — TELEPHONE ENCOUNTER
Rx Refill Note  Requested Prescriptions     Pending Prescriptions Disp Refills    levothyroxine (SYNTHROID, LEVOTHROID) 75 MCG tablet 90 tablet 0     Sig: Take 1 tablet by mouth Daily.          Last office visit with prescribing clinician: 2/4/2025     Next office visit with prescribing clinician: 5/8/2025   }    Sascha Vences MA  04/08/25, 08:17 EDT

## 2025-06-06 ENCOUNTER — OFFICE VISIT (OUTPATIENT)
Dept: ENDOCRINOLOGY | Facility: CLINIC | Age: 55
End: 2025-06-06
Payer: COMMERCIAL

## 2025-06-06 VITALS
DIASTOLIC BLOOD PRESSURE: 60 MMHG | HEART RATE: 80 BPM | OXYGEN SATURATION: 97 % | SYSTOLIC BLOOD PRESSURE: 112 MMHG | WEIGHT: 175 LBS | BODY MASS INDEX: 25.05 KG/M2 | HEIGHT: 70 IN

## 2025-06-06 DIAGNOSIS — E11.65 TYPE 2 DIABETES MELLITUS WITH HYPERGLYCEMIA, WITH LONG-TERM CURRENT USE OF INSULIN: Primary | ICD-10-CM

## 2025-06-06 DIAGNOSIS — Z79.4 TYPE 2 DIABETES MELLITUS WITH HYPERGLYCEMIA, WITH LONG-TERM CURRENT USE OF INSULIN: Primary | ICD-10-CM

## 2025-06-06 LAB
EXPIRATION DATE: ABNORMAL
EXPIRATION DATE: ABNORMAL
GLUCOSE BLDC GLUCOMTR-MCNC: 197 MG/DL (ref 70–130)
HBA1C MFR BLD: 7.6 % (ref 4.5–5.7)
Lab: ABNORMAL
Lab: ABNORMAL

## 2025-06-06 RX ORDER — DULAGLUTIDE 0.75 MG/.5ML
0.75 INJECTION, SOLUTION SUBCUTANEOUS WEEKLY
Qty: 2 ML | Refills: 3 | Status: SHIPPED | OUTPATIENT
Start: 2025-06-06

## 2025-06-06 NOTE — PROGRESS NOTES
Office Note      Date: 2025  Patient Name: Keny Rosen  MRN: [id number]  : 1970    Chief Complaint   Patient presents with    Diabetes     Type 2 diabetes mellitus with hyperglycemia, with long-term current use of insulin    Hypothyroidism    Hyperlipidemia       History of Present Illness:   Keny Rosen is a 55 y.o. male who presents for Diabetes type 2.   Diagnosed: ~  Current RX:  Lantus 26 units, Novolog 12 units with breakfast, 10 units with lunch, 14 units with dinner, metformin ER 1000 mg BID, Januvia 100 mg     Bg checks are done: continuously with Dexcom  Hypoglycemia : occasionally, at night recently    Patient has never tried a GLP1-RA because he was anxious about losing weight. Has gained a few lbs and would like to try it now.    He still administering 12 units of Novolog with each meal. Notes that he often leaves insulin in his car, where it probably gets hot. Does have time when he feels like Novolog does not work as well.    Last A1c:  Hemoglobin A1C   Date Value Ref Range Status   2025 7.6 (A) 4.5 - 5.7 % Final     The last 2 weeks of CGM data are reviewed.  2 % are low  43 % are in range  26 % are 180-250  29 % are >250  GMI: 8.2  The glycemic pattern shows:  postprandial hyperglycemia after evening meal, occasional daytime hypoglycemia    Changes in health since last visit: none.     DM Health Maintenance:  Ophtho:  scheduled for 25   Monofilament / Foot exam: 25   Lipids/Statin: taking a statin with last FLP showing LDL ordered 25   SAVANAH: 1/3/24  TSH: 1/3/24 1.870  Aspirin: not indicated  ACE/ARB: not indicated      Diabetic Complications:  Eyes: No  Kidneys: No  Feet: No  Heart: No      Subjective          Review of Systems:   Review of Systems   Constitutional:  Negative for activity change, appetite change and fatigue.   Respiratory:  Negative for chest tightness and shortness of breath.    Gastrointestinal:  Negative for abdominal pain.  "  Musculoskeletal:  Negative for myalgias.   Neurological:  Negative for numbness.   Psychiatric/Behavioral:  The patient is not nervous/anxious.        The following portions of the patient's history were reviewed and updated as appropriate: allergies, current medications, past family history, past medical history, past social history, past surgical history, and problem list.    Objective     Visit Vitals  /60 (BP Location: Left arm, Patient Position: Sitting, Cuff Size: Adult)   Pulse 80   Ht 177.8 cm (70\")   Wt 79.4 kg (175 lb)   SpO2 97%   BMI 25.11 kg/m²           Physical Exam:  Physical Exam  Constitutional:       Appearance: He is well-developed.   HENT:      Head: Normocephalic and atraumatic.      Right Ear: External ear normal.      Left Ear: External ear normal.   Eyes:      Conjunctiva/sclera: Conjunctivae normal.   Pulmonary:      Effort: Pulmonary effort is normal.   Musculoskeletal:         General: Normal range of motion.      Cervical back: Normal range of motion.   Skin:     General: Skin is warm and dry.   Psychiatric:         Behavior: Behavior normal.          Assessment / Plan      Assessment & Plan:  Diagnoses and all orders for this visit:    1. Type 2 diabetes mellitus with hyperglycemia, with long-term current use of insulin (Primary)  Assessment & Plan:  Diabetes is improving with treatment.   Medication changes per orders.  Recommended an ADA diet.  Regular aerobic exercise.    Will try switching from Januvia to GLP-1 RA to see if this offers better glucose control. Selected Trulicity because patient does not want to lose a significant amount of weight. If covered by insurance, he will start with 0.75 mg weekly and stop januvia. Will increase Trulicity dose as indicated.    Advised patient to make sure his insulin is not getting overheated in his car, needs to stay cool, at room temperature. Also advised him to increase Novolog amount to 12 units with breakfast, reduce to 10 units " with lunch and increase to 14 units with dinner.    Eye exam up to day, foot exam up to date, fasting labs previously ordered- patient is to go to a [facility] lab when fasting to have them done. Urine microalbumin creatinine ratio up to date.    Diabetes will be reassessed in 3 months    Orders:  -     POC Glucose, Blood  -     POC Glycosylated Hemoglobin (Hb A1C)  -     Dulaglutide (Trulicity) 0.75 MG/0.5ML solution auto-injector; Inject 0.75 mg under the skin into the appropriate area as directed 1 (One) Time Per Week.  Dispense: 2 mL; Refill: 3        Return in about 3 months (around 9/6/2025) for Follow up.    Portions of this note were completed with voice recognition program.  Electronically signed by Deborah Higgins PA-C  [facility] [place] [facility]  06/06/2025

## 2025-06-06 NOTE — ASSESSMENT & PLAN NOTE
Diabetes is improving with treatment.   Medication changes per orders.  Recommended an ADA diet.  Regular aerobic exercise.    Will try switching from Januvia to GLP-1 RA to see if this offers better glucose control. Selected Trulicity because patient does not want to lose a significant amount of weight. If covered by insurance, he will start with 0.75 mg weekly and stop januvia. Will increase Trulicity dose as indicated.    Advised patient to make sure his insulin is not getting overheated in his car, needs to stay cool, at room temperature. Also advised him to increase Novolog amount to 12 units with breakfast, reduce to 10 units with lunch and increase to 14 units with dinner.    Eye exam up to day, foot exam up to date, fasting labs previously ordered- patient is to go to a [facility] lab when fasting to have them done. Urine microalbumin creatinine ratio up to date.    Diabetes will be reassessed in 3 months

## 2025-07-07 DIAGNOSIS — Z79.4 TYPE 2 DIABETES MELLITUS WITH HYPERGLYCEMIA, WITH LONG-TERM CURRENT USE OF INSULIN: ICD-10-CM

## 2025-07-07 DIAGNOSIS — E11.65 TYPE 2 DIABETES MELLITUS WITH HYPERGLYCEMIA, WITH LONG-TERM CURRENT USE OF INSULIN: ICD-10-CM

## 2025-07-07 RX ORDER — INSULIN ASPART INJECTION 100 [IU]/ML
INJECTION, SOLUTION SUBCUTANEOUS
Qty: 45 ML | Refills: 1 | Status: SHIPPED | OUTPATIENT
Start: 2025-07-07

## 2025-07-07 RX ORDER — BLOOD SUGAR DIAGNOSTIC
STRIP MISCELLANEOUS
Qty: 100 EACH | Refills: 3 | Status: SHIPPED | OUTPATIENT
Start: 2025-07-07

## 2025-07-07 NOTE — TELEPHONE ENCOUNTER
Rx Refill Note  Requested Prescriptions      No prescriptions requested or ordered in this encounter        Last office visit with prescribing clinician: 6/6/2025      Next office visit with prescribing clinician: 10/13/2025       Emily Cox (Jodi)  07/07/25, 12:30 EDT

## 2025-07-31 RX ORDER — PEN NEEDLE, DIABETIC 32GX 5/32"
NEEDLE, DISPOSABLE MISCELLANEOUS
Qty: 400 EACH | Refills: 3 | Status: SHIPPED | OUTPATIENT
Start: 2025-07-31